# Patient Record
Sex: FEMALE | Race: WHITE | Employment: OTHER | URBAN - METROPOLITAN AREA
[De-identification: names, ages, dates, MRNs, and addresses within clinical notes are randomized per-mention and may not be internally consistent; named-entity substitution may affect disease eponyms.]

---

## 2020-08-28 ENCOUNTER — HOSPITAL ENCOUNTER (INPATIENT)
Age: 85
LOS: 4 days | Discharge: NURSING, REHAB OR LONG-TERM CARE | DRG: 481 | End: 2020-09-01
Attending: EMERGENCY MEDICINE | Admitting: INTERNAL MEDICINE
Payer: MEDICARE

## 2020-08-28 ENCOUNTER — APPOINTMENT (OUTPATIENT)
Dept: CT IMAGING | Age: 85
DRG: 481 | End: 2020-08-28
Payer: MEDICARE

## 2020-08-28 ENCOUNTER — APPOINTMENT (OUTPATIENT)
Dept: GENERAL RADIOLOGY | Age: 85
DRG: 481 | End: 2020-08-28
Payer: MEDICARE

## 2020-08-28 PROBLEM — M25.561 PAIN AND SWELLING OF RIGHT KNEE: Status: ACTIVE | Noted: 2020-08-28

## 2020-08-28 PROBLEM — M25.461 PAIN AND SWELLING OF RIGHT KNEE: Status: ACTIVE | Noted: 2020-08-28

## 2020-08-28 LAB
ABO/RH: NORMAL
ANION GAP SERPL CALCULATED.3IONS-SCNC: 9 MMOL/L (ref 3–16)
ANTIBODY SCREEN: NORMAL
ANTIBODY SCREEN: NORMAL
APTT: 32.1 SEC (ref 24.2–36.2)
BASOPHILS ABSOLUTE: 0.1 K/UL (ref 0–0.2)
BASOPHILS RELATIVE PERCENT: 0.7 %
BUN BLDV-MCNC: 26 MG/DL (ref 7–20)
CALCIUM SERPL-MCNC: 9.2 MG/DL (ref 8.3–10.6)
CHLORIDE BLD-SCNC: 105 MMOL/L (ref 99–110)
CO2: 27 MMOL/L (ref 21–32)
CREAT SERPL-MCNC: 1 MG/DL (ref 0.6–1.2)
EOSINOPHILS ABSOLUTE: 0 K/UL (ref 0–0.6)
EOSINOPHILS RELATIVE PERCENT: 0.2 %
GFR AFRICAN AMERICAN: >60
GFR NON-AFRICAN AMERICAN: 53
GLUCOSE BLD-MCNC: 131 MG/DL (ref 70–99)
HCT VFR BLD CALC: 30.6 % (ref 36–48)
HEMOGLOBIN: 10.3 G/DL (ref 12–16)
INR BLD: 1.09 (ref 0.86–1.14)
LYMPHOCYTES ABSOLUTE: 0.8 K/UL (ref 1–5.1)
LYMPHOCYTES RELATIVE PERCENT: 7.6 %
MCH RBC QN AUTO: 31.8 PG (ref 26–34)
MCHC RBC AUTO-ENTMCNC: 33.7 G/DL (ref 31–36)
MCV RBC AUTO: 94.3 FL (ref 80–100)
MONOCYTES ABSOLUTE: 1 K/UL (ref 0–1.3)
MONOCYTES RELATIVE PERCENT: 9.3 %
NEUTROPHILS ABSOLUTE: 8.9 K/UL (ref 1.7–7.7)
NEUTROPHILS RELATIVE PERCENT: 82.2 %
PDW BLD-RTO: 14.2 % (ref 12.4–15.4)
PLATELET # BLD: 254 K/UL (ref 135–450)
PMV BLD AUTO: 8.5 FL (ref 5–10.5)
POTASSIUM REFLEX MAGNESIUM: 4.5 MMOL/L (ref 3.5–5.1)
PROTHROMBIN TIME: 12.7 SEC (ref 10–13.2)
RBC # BLD: 3.24 M/UL (ref 4–5.2)
SODIUM BLD-SCNC: 141 MMOL/L (ref 136–145)
WBC # BLD: 10.8 K/UL (ref 4–11)

## 2020-08-28 PROCEDURE — 73502 X-RAY EXAM HIP UNI 2-3 VIEWS: CPT

## 2020-08-28 PROCEDURE — 85610 PROTHROMBIN TIME: CPT

## 2020-08-28 PROCEDURE — 86900 BLOOD TYPING SEROLOGIC ABO: CPT

## 2020-08-28 PROCEDURE — U0003 INFECTIOUS AGENT DETECTION BY NUCLEIC ACID (DNA OR RNA); SEVERE ACUTE RESPIRATORY SYNDROME CORONAVIRUS 2 (SARS-COV-2) (CORONAVIRUS DISEASE [COVID-19]), AMPLIFIED PROBE TECHNIQUE, MAKING USE OF HIGH THROUGHPUT TECHNOLOGIES AS DESCRIBED BY CMS-2020-01-R: HCPCS

## 2020-08-28 PROCEDURE — 6370000000 HC RX 637 (ALT 250 FOR IP): Performed by: STUDENT IN AN ORGANIZED HEALTH CARE EDUCATION/TRAINING PROGRAM

## 2020-08-28 PROCEDURE — 99284 EMERGENCY DEPT VISIT MOD MDM: CPT

## 2020-08-28 PROCEDURE — 85730 THROMBOPLASTIN TIME PARTIAL: CPT

## 2020-08-28 PROCEDURE — 73562 X-RAY EXAM OF KNEE 3: CPT

## 2020-08-28 PROCEDURE — 1200000000 HC SEMI PRIVATE

## 2020-08-28 PROCEDURE — 85025 COMPLETE CBC W/AUTO DIFF WBC: CPT

## 2020-08-28 PROCEDURE — 86850 RBC ANTIBODY SCREEN: CPT

## 2020-08-28 PROCEDURE — 2580000003 HC RX 258: Performed by: STUDENT IN AN ORGANIZED HEALTH CARE EDUCATION/TRAINING PROGRAM

## 2020-08-28 PROCEDURE — P9016 RBC LEUKOCYTES REDUCED: HCPCS

## 2020-08-28 PROCEDURE — 80048 BASIC METABOLIC PNL TOTAL CA: CPT

## 2020-08-28 PROCEDURE — 86923 COMPATIBILITY TEST ELECTRIC: CPT

## 2020-08-28 PROCEDURE — 71045 X-RAY EXAM CHEST 1 VIEW: CPT

## 2020-08-28 PROCEDURE — 86901 BLOOD TYPING SEROLOGIC RH(D): CPT

## 2020-08-28 PROCEDURE — 73700 CT LOWER EXTREMITY W/O DYE: CPT

## 2020-08-28 RX ORDER — BISACODYL 10 MG
10 SUPPOSITORY, RECTAL RECTAL NIGHTLY PRN
COMMUNITY

## 2020-08-28 RX ORDER — SODIUM CHLORIDE 0.9 % (FLUSH) 0.9 %
10 SYRINGE (ML) INJECTION EVERY 12 HOURS SCHEDULED
Status: DISCONTINUED | OUTPATIENT
Start: 2020-08-28 | End: 2020-09-01 | Stop reason: HOSPADM

## 2020-08-28 RX ORDER — ACETAMINOPHEN 650 MG/1
650 SUPPOSITORY RECTAL EVERY 6 HOURS PRN
Status: DISCONTINUED | OUTPATIENT
Start: 2020-08-28 | End: 2020-09-01 | Stop reason: HOSPADM

## 2020-08-28 RX ORDER — CALCIUM CARBONATE 500(1250)
500 TABLET ORAL 2 TIMES DAILY
COMMUNITY

## 2020-08-28 RX ORDER — LOPERAMIDE HYDROCHLORIDE 2 MG/1
2 CAPSULE ORAL 4 TIMES DAILY PRN
COMMUNITY

## 2020-08-28 RX ORDER — ONDANSETRON 2 MG/ML
4 INJECTION INTRAMUSCULAR; INTRAVENOUS EVERY 6 HOURS PRN
Status: DISCONTINUED | OUTPATIENT
Start: 2020-08-28 | End: 2020-09-01 | Stop reason: HOSPADM

## 2020-08-28 RX ORDER — FLUVOXAMINE MALEATE 50 MG/1
75 TABLET, COATED ORAL NIGHTLY
Status: DISCONTINUED | OUTPATIENT
Start: 2020-08-28 | End: 2020-09-01 | Stop reason: HOSPADM

## 2020-08-28 RX ORDER — OXYCODONE HYDROCHLORIDE AND ACETAMINOPHEN 5; 325 MG/1; MG/1
1 TABLET ORAL EVERY 4 HOURS PRN
Status: ON HOLD | COMMUNITY
End: 2020-08-30 | Stop reason: HOSPADM

## 2020-08-28 RX ORDER — FLUVOXAMINE MALEATE 100 MG
50 TABLET ORAL NIGHTLY
Status: DISCONTINUED | OUTPATIENT
Start: 2020-08-28 | End: 2020-08-28 | Stop reason: SDUPTHER

## 2020-08-28 RX ORDER — UREA 10 %
3 LOTION (ML) TOPICAL NIGHTLY PRN
Status: DISCONTINUED | OUTPATIENT
Start: 2020-08-28 | End: 2020-09-01 | Stop reason: HOSPADM

## 2020-08-28 RX ORDER — DOCUSATE SODIUM 100 MG/1
100 CAPSULE, LIQUID FILLED ORAL 2 TIMES DAILY
COMMUNITY

## 2020-08-28 RX ORDER — POLYETHYLENE GLYCOL 3350 17 G/17G
17 POWDER, FOR SOLUTION ORAL DAILY
COMMUNITY

## 2020-08-28 RX ORDER — LEVOTHYROXINE SODIUM 0.03 MG/1
25 TABLET ORAL DAILY
Status: DISCONTINUED | OUTPATIENT
Start: 2020-08-29 | End: 2020-09-01 | Stop reason: HOSPADM

## 2020-08-28 RX ORDER — GUAIFENESIN 100 MG/5ML
200 SOLUTION ORAL EVERY 6 HOURS PRN
COMMUNITY

## 2020-08-28 RX ORDER — PROMETHAZINE HYDROCHLORIDE 25 MG/1
12.5 TABLET ORAL EVERY 6 HOURS PRN
Status: DISCONTINUED | OUTPATIENT
Start: 2020-08-28 | End: 2020-09-01 | Stop reason: HOSPADM

## 2020-08-28 RX ORDER — LORATADINE 10 MG/1
10 TABLET ORAL EVERY EVENING
COMMUNITY

## 2020-08-28 RX ORDER — POLYETHYLENE GLYCOL 3350 17 G/17G
17 POWDER, FOR SOLUTION ORAL DAILY PRN
Status: DISCONTINUED | OUTPATIENT
Start: 2020-08-28 | End: 2020-09-01 | Stop reason: HOSPADM

## 2020-08-28 RX ORDER — SODIUM CHLORIDE 9 MG/ML
INJECTION, SOLUTION INTRAVENOUS CONTINUOUS
Status: DISCONTINUED | OUTPATIENT
Start: 2020-08-28 | End: 2020-09-01 | Stop reason: HOSPADM

## 2020-08-28 RX ORDER — LORAZEPAM 0.5 MG/1
0.5 TABLET ORAL 2 TIMES DAILY
COMMUNITY

## 2020-08-28 RX ORDER — FAMOTIDINE 20 MG/1
20 TABLET, FILM COATED ORAL NIGHTLY
Status: DISCONTINUED | OUTPATIENT
Start: 2020-08-28 | End: 2020-08-28

## 2020-08-28 RX ORDER — SODIUM CHLORIDE 0.9 % (FLUSH) 0.9 %
10 SYRINGE (ML) INJECTION PRN
Status: DISCONTINUED | OUTPATIENT
Start: 2020-08-28 | End: 2020-09-01 | Stop reason: HOSPADM

## 2020-08-28 RX ORDER — ACETAMINOPHEN 325 MG/1
650 TABLET ORAL EVERY 6 HOURS PRN
Status: DISCONTINUED | OUTPATIENT
Start: 2020-08-28 | End: 2020-09-01 | Stop reason: HOSPADM

## 2020-08-28 RX ORDER — MAGNESIUM OXIDE 400 MG/1
1000 TABLET ORAL DAILY
Status: DISCONTINUED | OUTPATIENT
Start: 2020-08-28 | End: 2020-08-28

## 2020-08-28 RX ORDER — OXYMETAZOLINE HYDROCHLORIDE 0.05 G/100ML
2 SPRAY NASAL 2 TIMES DAILY PRN
COMMUNITY

## 2020-08-28 RX ORDER — ACETAMINOPHEN 325 MG/1
650 TABLET ORAL ONCE
Status: COMPLETED | OUTPATIENT
Start: 2020-08-28 | End: 2020-08-28

## 2020-08-28 RX ADMIN — SODIUM CHLORIDE: 9 INJECTION, SOLUTION INTRAVENOUS at 21:22

## 2020-08-28 RX ADMIN — Medication 10 ML: at 21:23

## 2020-08-28 RX ADMIN — ACETAMINOPHEN 650 MG: 325 TABLET ORAL at 19:38

## 2020-08-28 ASSESSMENT — PAIN SCALES - GENERAL: PAINLEVEL_OUTOF10: 0

## 2020-08-28 NOTE — ED TRIAGE NOTES
PT WITH NOTED RIGHT KNEE SWELLING, UNKNOWN ORIGIN, NO KNOWN FALL FROM NURSING HOME, NO BRUISING NOTED, DENIES PAIN, DEMENTIA, ON ELIQUIS

## 2020-08-28 NOTE — ED PROVIDER NOTES
[address] [facility] RESIDENT NOTE       Date of evaluation: 8/28/2020    Chief Complaint     Leg Swelling (RIGHT KNEE)      of Present Illness     Meng Mendoza is a 80 y.o. female presents with right knee swelling. She lives in a memory unit at a skilled nursing facility for her Alzheimer's and was found to have a swollen knee today. Unknown report as to whether she fell or why the knee is swollen. Her  at bedside states that he saw her yesterday and her knee was normal, therefore helping with the timeframe sometime between the last day. The patient is a poor historian at baseline due to her dementia, has some mild pain in the knee and is wheelchair-bound at baseline. Denies any other symptoms. Her  states she has had a total hip replacement on the right. Other than stated above, no additional aggravating or alleviating factors are noted. Review of Systems     Unable to obtain due to patient's poor mental status    Past Medical, Surgical, Family, and Social History     She has a past medical history of Bleeding ulcer, Carpal tunnel syndrome, Mucous carcinoma ([address] and Stroke ([facility] [address] She has a past surgical history that includes Rotator cuff repair (2004) and Wrist fracture surgery (05/2009). Her family history includes Alzheimer's Disease in her brother; Arthritis in her mother; Diabetes in her mother. She reports that she has never smoked. She does not have any smokeless tobacco history on file. She reports current alcohol use. She reports that she does not use drugs. Medications     Previous Medications    ACETAMINOPHEN (TYLENOL) 500 MG TABLET    Take 500 mg by mouth every 6 hours as needed for Pain    ACIPHEX 20 MG TABLET        APIXABAN (ELIQUIS) 2.5 MG TABS TABLET    Take 2.5 mg by mouth 2 times daily    CONJUGATED ESTROGENS (PREMARIN) 0.625 MG/GM VAGINAL CREAM    Place 0.5 g vaginally twice a week Place vaginally daily. DEXTROMETHORPHAN-GUAIFENESIN (MUCINEX DM)  MG PER EXTENDED RELEASE TABLET    Take 1 tablet by mouth every 12 hours as needed    DICLOFENAC SODIUM 1 % GEL    Apply 2 g topically 2 times daily    FLUVOXAMINE (LUVOX) 50 MG TABLET    Take 50 mg by mouth nightly    FOSAMAX PLUS D  MG-UNIT PER TABLET        LEVOTHYROXINE (SYNTHROID) 25 MCG TABLET    Take 25 mcg by mouth Daily    LORAZEPAM (ATIVAN) 0.5 MG TABLET    Take 0.5 mg by mouth every 6 hours as needed for Anxiety. Radha Simpler MAGNESIUM OXIDE (MAG-OX) 400 MG TABLET    Take 1,000 mg by mouth daily    MELATONIN 3 MG TABS TABLET    Take 3 mg by mouth nightly as needed    METOPROLOL (TOPROL XL) 25 MG XL TABLET    Take 50 mg by mouth daily     RANITIDINE (ZANTAC) 150 MG CAPSULE    Take 150 mg by mouth nightly    ROPINIROLE (REQUIP) 2 MG TABLET    Take 2 mg by mouth 3 times daily    SENNA (SENOKOT) 8.6 MG TABLET    Take 1 tablet by mouth every 12 hours as needed for Constipation    VITAMIN C (ASCORBIC ACID) 500 MG TABLET    Take 500 mg by mouth 3 times daily       Allergies     She is allergic to bactrim; erythromycin; penicillins; and sulfa antibiotics. Physical Exam     INITIAL VITALS: BP: 108/81, Temp: 98.4 °F (36.9 °C), Pulse: 78, Resp: 14,     General:  Well appearing. No acute distress  Eyes:  PERRL. No discharge from eyes   ENT:  No discharge from nose. OP clear  Neck:  Supple, trachea midline  Pulmonary:   Non-labored breathing. Breath sounds clear bilaterally  Cardiac:  Regular rate and rhythm. No murmurs  Abdomen:  Soft. Non-distended. Non-tender. Musculoskeletal: No open fracture, significant joint effusion of the right knee with apparent deformity. Strong DP pulses and she is neurovascularly intact. She has some tenderness to palpation on the anterior aspect of the knee tracking up into the femur. She is able to both flex and extend the knee. Vascular:  Extremities warm and perfused.  Normal pulses in all 4 extremities  Skin:  Dry, no rashes  Extremities:  No peripheral edema  Neuro: Alert. Moves all four extremities to command. Sensation grossly intact to light touch. Speech and mentation normal. No focal deficit. Gait narrow and stable. Diagnostic Results     EKG   No EKG performed    RADIOLOGY:  XR CHEST PORTABLE   Final Result      Chronic interstitial lung disease, acute on chronic process not excluded, but no dense focal consolidation. If desired CT of chest may be helpful for further characterization. XR KNEE RIGHT (3 VIEWS)   Final Result      No acute injury. RIGHT KNEE:      REASON FOR EXAM: Trauma, pain      FINDINGS:      Somewhat obliqued AP and crosstable lateral views of the right knee again demonstrate long stem intramedullary nail within the right femur with single distal interlocking screw. Comminuted fracture of the distal femoral diaphysis in the region of the    distal aspect of the intramedullary nail with varus angulation. No additional fractures. Knee joint maintained. IMPRESSION:      Comminuted angulated fracture of the distal femoral diaphysis. XR HIP 2-3 VW W PELVIS RIGHT   Final Result      No acute injury. RIGHT KNEE:      REASON FOR EXAM: Trauma, pain      FINDINGS:      Somewhat obliqued AP and crosstable lateral views of the right knee again demonstrate long stem intramedullary nail within the right femur with single distal interlocking screw. Comminuted fracture of the distal femoral diaphysis in the region of the    distal aspect of the intramedullary nail with varus angulation. No additional fractures. Knee joint maintained. IMPRESSION:      Comminuted angulated fracture of the distal femoral diaphysis. LABS:   No results found for this visit on 08/28/20.     ED BEDSIDE ULTRASOUND:      RECENT VITALS:  BP: 108/81, Temp: 98.4 °F (36.9 °C), Pulse: 78,Resp: 14,       Procedures         ED Course     Nursing Notes, Past Medical Hx, Past Surgical Hx, Social Hx, Allergies, and Family Hx were reviewed. The patient was given the followingmedications:  Orders Placed This Encounter   Medications    acetaminophen (TYLENOL) tablet 650 mg       CONSULTS:  Keesha Jain / ASSESSMENT / Costella Schirmer is a 80 y.o. female with a history and presentation as described above in HPI. The patient was evaluated by myself and the ED Attending Physician, Dr. Christopher Martínez. All management and disposition plans were discussed and agreed upon. Upon presentation, the patient was well appearing and had stable vitals. Obtained hip and knee fractures on the right which showed comminuted angulated fracture of the distal femoral diaphysis. Will consult orthopedic surgery and admit to medicine. Basic preop work-up ordered including CBC, BMP, type and screen, INR, PTT, chest x-ray, EKG, urinalysis, COVID test.    Orthopedic surgery recommended CT scan of the knee for better characterization of whether the fracture extended to the joint. He also would like her placed in a knee immobilizer. Plan is for surgery in the morning. Pain control with Tylenol. The patient does not report significant pain while she is lying at rest.    Admit: At this time the patient has been admitted to medicine for further evaluation and management of femur fracture. The patient will continue to be monitored here in the emergency department until which time she is moved to her new treatment location. Discussed with admitting team.    Medications received during this ED visit:    Medications   acetaminophen (TYLENOL) tablet 650 mg (has no administration in time range)       Clinical Impression     1.  Closed fracture of distal end of right femur, unspecified fracture morphology, initial encounter [facility])        Disposition     PATIENT REFERRED TO:  MD Vahe Gonzales [place] [address]:  New Prescriptions    No medications on file       Lakhwinder Carter MD  Resident  08/28/20 Franklin Ku MD  Resident  08/28/20 3113

## 2020-08-28 NOTE — ED NOTES
Attempt to call report, RN states not able accept report at this time, will cont to monitor     Sameera Pretty, JESSICA  08/28/20 1934

## 2020-08-28 NOTE — H&P
Internal Medicine  PGY 1  History & Physical      CC: right knee pain     History Obtained From:  patient, spouse (poor historians)    HISTORY OF PRESENT ILLNESS: Sharad Stewart is a 79yo female presenting with right knee swelling. She lives in a memory unit at a skilled nursing facility and her  helped provide history. Neither party is sure exactly what occurred; pt's  went to see his wife for his daily visit when he noticed her knee swelling. He asked what happened and the nurse said they were getting an XR to evaluate. He saw her yesterday but did not appreciate the swelling then. He decided to then take his wife to the ED to get it looked at. Pt was unable to answer many questions but was not in pain. Attempted to call Pt's residence for further information but was unable to get ahold of anyone. In the ED VSS, xray of the right knee revealed a comminuted angulated fracture of the distal femoral diaphysis. CT ordered to assess for joint involvement. Orthopedic surgery- tomorrow AM.     Past Medical History:        Diagnosis Date    Bleeding ulcer 1988 and 1989    Carpal tunnel syndrome     [facility] [address]     Stroke ([facility] [address] 1-1-11       Past Surgical History:        Procedure Laterality Date    ROTATOR CUFF REPAIR  2004    left shoulder    WRIST FRACTURE SURGERY  05/2009    left wrist       Medications Priorto Admission:    Not in a hospital admission. Allergies:  Bactrim; Erythromycin; Penicillins; and Sulfa antibiotics    Social History:   · TOBACCO:   reports that she has never smoked. She does not have any smokeless tobacco history on file.   · ETOH:  Does not drink  · DRUGS : Does not drink   · Patient currently lives in a nursing home  ·   Family History:       Problem Relation Age of Onset    Arthritis Mother     Diabetes Mother     Alzheimer's Disease Brother        Review of Systems   Unable to perform ROS: Dementia       ROS: A 10 point review of systems was crosstable lateral views of the right knee again demonstrate long stem intramedullary nail within the right femur with single distal interlocking screw. Comminuted fracture of the distal femoral diaphysis in the region of the    distal aspect of the intramedullary nail with varus angulation. No additional fractures. Knee joint maintained. IMPRESSION:      Comminuted angulated fracture of the distal femoral diaphysis. XR HIP 2-3 VW W PELVIS RIGHT   Final Result      No acute injury. RIGHT KNEE:      REASON FOR EXAM: Trauma, pain      FINDINGS:      Somewhat obliqued AP and crosstable lateral views of the right knee again demonstrate long stem intramedullary nail within the right femur with single distal interlocking screw. Comminuted fracture of the distal femoral diaphysis in the region of the    distal aspect of the intramedullary nail with varus angulation. No additional fractures. Knee joint maintained. IMPRESSION:      Comminuted angulated fracture of the distal femoral diaphysis. CT KNEE RIGHT WO CONTRAST    (Results Pending)       ASSESSMENT AND PLAN:  Lillian Tate is an 79yo F w a PMH of dementia and CVA p/w right knee swelling. Pt does not recall how the event occurred and  noticed the swelling and brought her to the ED. Right Femoral Fracture:  XR right knee showing comminuted angulated fracture of the distil femoral diaphysis. Ortho involved.   - CT right knee w/o contrast per ortho   - surgery tomorrow am, basic pre-op orders   - acetaminophen q6h  - IVF  - NPO at midnight     Anemia:  HGB 10.8  - measure H&H    Will discuss with attending physician Dr. Galvez Meter Status: Full code  FEN: npo after midnight  PPX: none  DISPO: Jerry Perez 9 MS4    Pablo Dominguez DO, PGY 1  8/28/2020,  7:32 PM

## 2020-08-28 NOTE — ED PROVIDER NOTES
ED Attending Attestation Note     Date of evaluation: 8/28/2020    This patient was seen by the resident. I have seen and examined the patient, agree with the workup, evaluation, management and diagnosis. The care plan has been discussed. I have reviewed the ECG and concur with the resident's interpretation. My assessment reveals patient with an injury to her right knee. Unfortunately patient has a history of Alzheimer's and unable to give us any history. On exam patient has obvious deformity of her right knee but does have good distal pulses. Marshall Jimenez MD  08/28/20 [id number]

## 2020-08-28 NOTE — ED NOTES
Report called to floor, questions answered, safe hand off of care, request CT scan complete before transport, notified that might be delayed d/t to ED unit acuity at this time      Trey Shah, JESSICA  08/28/20 1950

## 2020-08-28 NOTE — H&P
Internal Medicine  PGY ***  History & Physical      CC: right knee pain     History Obtained From:  patient, spouse (poor historians)    HISTORY OF PRESENT ILLNESS: Lillian Tate is a 79yo female presenting with right knee swelling. She lives in a memory unit at a skilled nursing facility and her  helped provide history. Neither party is sure exactly what occurred; pt's  went to see his wife for his daily visit when he noticed her knee swelling. He asked what happened and the nurse said they were getting an XR to evaluate. He saw her yesterday but did not appreciate the swelling then. He decided to then take his wife to the ED to get it looked at. Pt was unable to answer many questions but was not in pain. Attempted to call Pt's residence for further information but was unable to get ahold of anyone. Past Medical History:        Diagnosis Date    Bleeding ulcer 1988 and 1989    Carpal tunnel syndrome     [facility] [address]     Stroke ([facility] [address] 1-1-11   ·     Past Surgical History:        Procedure Laterality Date    ROTATOR CUFF REPAIR  2004    left shoulder    WRIST FRACTURE SURGERY  05/2009    left wrist   ·     Medications Priorto Admission:    · Not in a hospital admission. Allergies:  Bactrim; Erythromycin; Penicillins; and Sulfa antibiotics    Social History:   · TOBACCO:   reports that she has never smoked. She does not have any smokeless tobacco history on file. · ETOH:  Does not drink  · DRUGS : Does not drink   · Patient currently lives {LIVING SITUATION:[id number]}  ·   Family History:       Problem Relation Age of Onset    Arthritis Mother     Diabetes Mother     Alzheimer's Disease Brother    ·     Review of Systems   Unable to perform ROS: Dementia       ROS: A 10 point review of systems was conducted, significant findings as noted in HPI. Physical Exam  Constitutional:       Appearance: Normal appearance. HENT:      Head: Normocephalic and atraumatic.       Nose: Nose normal.   Cardiovascular:      Rate and Rhythm: Normal rate and regular rhythm. Pulses: Normal pulses. Pulmonary:      Effort: Pulmonary effort is normal.      Breath sounds: Normal breath sounds. Abdominal:      General: Abdomen is flat. Musculoskeletal:         General: Swelling (r knee) and tenderness (w varus maneuver ) present. Skin:     General: Skin is dry. Neurological:      Mental Status: Mental status is at baseline. Vitals:    08/28/20 1527   BP: 108/81   Pulse: 78   Resp: 14   Temp: 98.4 °F (36.9 °C)       DATA:    Labs:  CBC:   Recent Labs     08/28/20  1821   WBC 10.8   HGB 10.3*   HCT 30.6*          BMP: No results for input(s): NA, K, CL, CO2, BUN, CREATININE, GLUCOSE, PHOS in the last 72 hours. Invalid input(s):  CA  LFT's: No results for input(s): AST, ALT, ALB, BILITOT, ALKPHOS in the last 72 hours. Troponin: No results for input(s): TROPONINI in the last 72 hours. BNP:No results for input(s): BNP in the last 72 hours. ABGs: No results for input(s): PHART, UWK1UTI, PO2ART in the last 72 hours. INR: No results for input(s): INR in the last 72 hours. U/A:No results for input(s): NITRITE, COLORU, PHUR, LABCAST, WBCUA, RBCUA, MUCUS, TRICHOMONAS, YEAST, BACTERIA, CLARITYU, SPECGRAV, LEUKOCYTESUR, UROBILINOGEN, BILIRUBINUR, BLOODU, GLUCOSEU, AMORPHOUS in the last 72 hours. Invalid input(s): KETONESU    XR CHEST PORTABLE   Final Result      Chronic interstitial lung disease, acute on chronic process not excluded, but no dense focal consolidation. If desired CT of chest may be helpful for further characterization. XR KNEE RIGHT (3 VIEWS)   Final Result      No acute injury. RIGHT KNEE:      REASON FOR EXAM: Trauma, pain      FINDINGS:      Somewhat obliqued AP and crosstable lateral views of the right knee again demonstrate long stem intramedullary nail within the right femur with single distal interlocking screw.  Comminuted fracture of the distal femoral diaphysis in the region of the    distal aspect of the intramedullary nail with varus angulation. No additional fractures. Knee joint maintained. IMPRESSION:      Comminuted angulated fracture of the distal femoral diaphysis. XR HIP 2-3 VW W PELVIS RIGHT   Final Result      No acute injury. RIGHT KNEE:      REASON FOR EXAM: Trauma, pain      FINDINGS:      Somewhat obliqued AP and crosstable lateral views of the right knee again demonstrate long stem intramedullary nail within the right femur with single distal interlocking screw. Comminuted fracture of the distal femoral diaphysis in the region of the    distal aspect of the intramedullary nail with varus angulation. No additional fractures. Knee joint maintained. IMPRESSION:      Comminuted angulated fracture of the distal femoral diaphysis. CT KNEE RIGHT WO CONTRAST    (Results Pending)       ASSESSMENT AND PLAN:  Jean Claude Martinez is an 79yo F w a PMH of dementia and CVA p/w right knee swelling. Pt does not recall how the event occurred and  noticed the swelling and brought her to the ED. Right Femoral Fracture:  XR right knee showing comminuted angulated fracture of the distil femoral diaphysis. Ortho involved.   - CT right knee w/o contrast per ortho   - surgery tomorrow am, basic pre-op orders   - acetaminophen 500mg q6  - IVF  - NPO at midnight     Anemia:  HGB 10.8  - measure H&H    Will discuss with attending physician      Code Status: Full code  FEN:   PPX:   DISPO: [email or web address]    Demario Richards, MS4  8/28/2020,  6:48 PM

## 2020-08-29 ENCOUNTER — APPOINTMENT (OUTPATIENT)
Dept: GENERAL RADIOLOGY | Age: 85
DRG: 481 | End: 2020-08-29
Payer: MEDICARE

## 2020-08-29 ENCOUNTER — ANESTHESIA EVENT (OUTPATIENT)
Dept: OPERATING ROOM | Age: 85
DRG: 481 | End: 2020-08-29
Payer: MEDICARE

## 2020-08-29 ENCOUNTER — ANESTHESIA (OUTPATIENT)
Dept: OPERATING ROOM | Age: 85
DRG: 481 | End: 2020-08-29
Payer: MEDICARE

## 2020-08-29 VITALS
DIASTOLIC BLOOD PRESSURE: 60 MMHG | RESPIRATION RATE: 14 BRPM | TEMPERATURE: 99 F | OXYGEN SATURATION: 100 % | SYSTOLIC BLOOD PRESSURE: 127 MMHG

## 2020-08-29 LAB
AMORPHOUS: ABNORMAL /HPF
ANION GAP SERPL CALCULATED.3IONS-SCNC: 10 MMOL/L (ref 3–16)
BACTERIA: ABNORMAL /HPF
BASOPHILS ABSOLUTE: 0.1 K/UL (ref 0–0.2)
BASOPHILS RELATIVE PERCENT: 1 %
BILIRUBIN URINE: NEGATIVE
BLOOD, URINE: NEGATIVE
BUN BLDV-MCNC: 21 MG/DL (ref 7–20)
CALCIUM SERPL-MCNC: 8.6 MG/DL (ref 8.3–10.6)
CHLORIDE BLD-SCNC: 106 MMOL/L (ref 99–110)
CLARITY: CLEAR
CO2: 25 MMOL/L (ref 21–32)
COLOR: YELLOW
CREAT SERPL-MCNC: 0.8 MG/DL (ref 0.6–1.2)
CRYSTALS, UA: ABNORMAL /HPF
EOSINOPHILS ABSOLUTE: 0 K/UL (ref 0–0.6)
EOSINOPHILS RELATIVE PERCENT: 0.5 %
GFR AFRICAN AMERICAN: >60
GFR NON-AFRICAN AMERICAN: >60
GLUCOSE BLD-MCNC: 102 MG/DL (ref 70–99)
GLUCOSE URINE: NEGATIVE MG/DL
HCT VFR BLD CALC: 29.4 % (ref 36–48)
HEMOGLOBIN: 10 G/DL (ref 12–16)
KETONES, URINE: NEGATIVE MG/DL
LEUKOCYTE ESTERASE, URINE: ABNORMAL
LYMPHOCYTES ABSOLUTE: 0.7 K/UL (ref 1–5.1)
LYMPHOCYTES RELATIVE PERCENT: 8.1 %
MAGNESIUM: 2.1 MG/DL (ref 1.8–2.4)
MCH RBC QN AUTO: 32 PG (ref 26–34)
MCHC RBC AUTO-ENTMCNC: 34 G/DL (ref 31–36)
MCV RBC AUTO: 94.1 FL (ref 80–100)
MICROSCOPIC EXAMINATION: YES
MONOCYTES ABSOLUTE: 1 K/UL (ref 0–1.3)
MONOCYTES RELATIVE PERCENT: 11.2 %
NEUTROPHILS ABSOLUTE: 7.2 K/UL (ref 1.7–7.7)
NEUTROPHILS RELATIVE PERCENT: 79.2 %
NITRITE, URINE: POSITIVE
PDW BLD-RTO: 14.4 % (ref 12.4–15.4)
PH UA: 8 (ref 5–8)
PHOSPHORUS: 3.1 MG/DL (ref 2.5–4.9)
PLATELET # BLD: 228 K/UL (ref 135–450)
PMV BLD AUTO: 8.5 FL (ref 5–10.5)
POTASSIUM REFLEX MAGNESIUM: 4 MMOL/L (ref 3.5–5.1)
PROTEIN UA: ABNORMAL MG/DL
RBC # BLD: 3.13 M/UL (ref 4–5.2)
RBC UA: ABNORMAL /HPF (ref 0–4)
SODIUM BLD-SCNC: 141 MMOL/L (ref 136–145)
SPECIFIC GRAVITY UA: 1.01 (ref 1–1.03)
T4 FREE: 0.9 NG/DL (ref 0.9–1.8)
T4 FREE: 1 NG/DL (ref 0.9–1.8)
TSH REFLEX: 7.4 UIU/ML (ref 0.27–4.2)
URINE TYPE: ABNORMAL
UROBILINOGEN, URINE: 0.2 E.U./DL
WBC # BLD: 9.1 K/UL (ref 4–11)
WBC UA: ABNORMAL /HPF (ref 0–5)

## 2020-08-29 PROCEDURE — 2580000003 HC RX 258: Performed by: ORTHOPAEDIC SURGERY

## 2020-08-29 PROCEDURE — 3700000001 HC ADD 15 MINUTES (ANESTHESIA): Performed by: ORTHOPAEDIC SURGERY

## 2020-08-29 PROCEDURE — 2580000003 HC RX 258: Performed by: STUDENT IN AN ORGANIZED HEALTH CARE EDUCATION/TRAINING PROGRAM

## 2020-08-29 PROCEDURE — 73552 X-RAY EXAM OF FEMUR 2/>: CPT

## 2020-08-29 PROCEDURE — 83735 ASSAY OF MAGNESIUM: CPT

## 2020-08-29 PROCEDURE — C1713 ANCHOR/SCREW BN/BN,TIS/BN: HCPCS | Performed by: ORTHOPAEDIC SURGERY

## 2020-08-29 PROCEDURE — 7100000000 HC PACU RECOVERY - FIRST 15 MIN: Performed by: ORTHOPAEDIC SURGERY

## 2020-08-29 PROCEDURE — 3209999900 FLUORO FOR SURGICAL PROCEDURES

## 2020-08-29 PROCEDURE — 1200000000 HC SEMI PRIVATE

## 2020-08-29 PROCEDURE — 81001 URINALYSIS AUTO W/SCOPE: CPT

## 2020-08-29 PROCEDURE — 3600000014 HC SURGERY LEVEL 4 ADDTL 15MIN: Performed by: ORTHOPAEDIC SURGERY

## 2020-08-29 PROCEDURE — 2709999900 HC NON-CHARGEABLE SUPPLY: Performed by: ORTHOPAEDIC SURGERY

## 2020-08-29 PROCEDURE — 2500000003 HC RX 250 WO HCPCS: Performed by: ANESTHESIOLOGY

## 2020-08-29 PROCEDURE — 6360000002 HC RX W HCPCS: Performed by: ORTHOPAEDIC SURGERY

## 2020-08-29 PROCEDURE — 85025 COMPLETE CBC W/AUTO DIFF WBC: CPT

## 2020-08-29 PROCEDURE — 3600000004 HC SURGERY LEVEL 4 BASE: Performed by: ORTHOPAEDIC SURGERY

## 2020-08-29 PROCEDURE — 0QSB04Z REPOSITION RIGHT LOWER FEMUR WITH INTERNAL FIXATION DEVICE, OPEN APPROACH: ICD-10-PCS | Performed by: ORTHOPAEDIC SURGERY

## 2020-08-29 PROCEDURE — 3700000000 HC ANESTHESIA ATTENDED CARE: Performed by: ORTHOPAEDIC SURGERY

## 2020-08-29 PROCEDURE — 2580000003 HC RX 258: Performed by: ANESTHESIOLOGY

## 2020-08-29 PROCEDURE — 80048 BASIC METABOLIC PNL TOTAL CA: CPT

## 2020-08-29 PROCEDURE — 84100 ASSAY OF PHOSPHORUS: CPT

## 2020-08-29 PROCEDURE — 2780000010 HC IMPLANT OTHER: Performed by: ORTHOPAEDIC SURGERY

## 2020-08-29 PROCEDURE — 0QPB04Z REMOVAL OF INTERNAL FIXATION DEVICE FROM RIGHT LOWER FEMUR, OPEN APPROACH: ICD-10-PCS | Performed by: ORTHOPAEDIC SURGERY

## 2020-08-29 PROCEDURE — 6360000002 HC RX W HCPCS: Performed by: ANESTHESIOLOGY

## 2020-08-29 PROCEDURE — 93005 ELECTROCARDIOGRAM TRACING: CPT | Performed by: INTERNAL MEDICINE

## 2020-08-29 PROCEDURE — 2720000010 HC SURG SUPPLY STERILE: Performed by: ORTHOPAEDIC SURGERY

## 2020-08-29 PROCEDURE — 6370000000 HC RX 637 (ALT 250 FOR IP): Performed by: ORTHOPAEDIC SURGERY

## 2020-08-29 PROCEDURE — 84443 ASSAY THYROID STIM HORMONE: CPT

## 2020-08-29 PROCEDURE — 36415 COLL VENOUS BLD VENIPUNCTURE: CPT

## 2020-08-29 PROCEDURE — 7100000001 HC PACU RECOVERY - ADDTL 15 MIN: Performed by: ORTHOPAEDIC SURGERY

## 2020-08-29 PROCEDURE — 84439 ASSAY OF FREE THYROXINE: CPT

## 2020-08-29 DEVICE — NCB PP DIST FEM PLATE, R,18 H, L. 355MM
Type: IMPLANTABLE DEVICE | Site: FEMUR | Status: FUNCTIONAL
Brand: NCB®

## 2020-08-29 DEVICE — NCB CANCELLOUS SCREW 5.0 32 L=75
Type: IMPLANTABLE DEVICE | Site: FEMUR | Status: FUNCTIONAL
Brand: NCB®

## 2020-08-29 DEVICE — NCB CANCELLOUS SCREW 5.0 32 L=65
Type: IMPLANTABLE DEVICE | Site: FEMUR | Status: FUNCTIONAL
Brand: NCB®

## 2020-08-29 DEVICE — NCB SCREW D 4.0 SELF-TAPPING, 28
Type: IMPLANTABLE DEVICE | Site: FEMUR | Status: FUNCTIONAL
Brand: NCB®

## 2020-08-29 DEVICE — KIRSCHNER WIRE, WITH TROCAR TIP, Ø 2.0 M
Type: IMPLANTABLE DEVICE | Site: FEMUR | Status: FUNCTIONAL
Brand: KIRSCHNER WIRE

## 2020-08-29 DEVICE — NCB SCREW D 4.0 SELF-TAPPING, 30
Type: IMPLANTABLE DEVICE | Site: FEMUR | Status: FUNCTIONAL
Brand: NCB®

## 2020-08-29 DEVICE — NCB CANCELLOUS SCREW 5.0 32 L=70
Type: IMPLANTABLE DEVICE | Site: FEMUR | Status: FUNCTIONAL
Brand: NCB®

## 2020-08-29 DEVICE — CABLE ORTH L91CM DIA1.8MM CO CHROM SMOOTH CBL-READY 00223200228] ZIMMER BIOMET TRAUMA]: Type: IMPLANTABLE DEVICE | Site: FEMUR | Status: FUNCTIONAL

## 2020-08-29 DEVICE — IMPLANTABLE DEVICE: Type: IMPLANTABLE DEVICE | Site: FEMUR | Status: FUNCTIONAL

## 2020-08-29 RX ORDER — OXYCODONE HYDROCHLORIDE 5 MG/1
5 TABLET ORAL EVERY 4 HOURS PRN
Status: DISCONTINUED | OUTPATIENT
Start: 2020-08-29 | End: 2020-09-01 | Stop reason: HOSPADM

## 2020-08-29 RX ORDER — DEXAMETHASONE SODIUM PHOSPHATE 4 MG/ML
INJECTION, SOLUTION INTRA-ARTICULAR; INTRALESIONAL; INTRAMUSCULAR; INTRAVENOUS; SOFT TISSUE PRN
Status: DISCONTINUED | OUTPATIENT
Start: 2020-08-29 | End: 2020-08-29 | Stop reason: SDUPTHER

## 2020-08-29 RX ORDER — ROCURONIUM BROMIDE 10 MG/ML
INJECTION, SOLUTION INTRAVENOUS PRN
Status: DISCONTINUED | OUTPATIENT
Start: 2020-08-29 | End: 2020-08-29 | Stop reason: SDUPTHER

## 2020-08-29 RX ORDER — MAGNESIUM HYDROXIDE 1200 MG/15ML
LIQUID ORAL CONTINUOUS PRN
Status: COMPLETED | OUTPATIENT
Start: 2020-08-29 | End: 2020-08-29

## 2020-08-29 RX ORDER — FENTANYL CITRATE 50 UG/ML
INJECTION, SOLUTION INTRAMUSCULAR; INTRAVENOUS PRN
Status: DISCONTINUED | OUTPATIENT
Start: 2020-08-29 | End: 2020-08-29 | Stop reason: SDUPTHER

## 2020-08-29 RX ORDER — DOCUSATE SODIUM 100 MG/1
100 CAPSULE, LIQUID FILLED ORAL 2 TIMES DAILY
Status: DISCONTINUED | OUTPATIENT
Start: 2020-08-29 | End: 2020-09-01 | Stop reason: HOSPADM

## 2020-08-29 RX ORDER — SODIUM CHLORIDE 0.9 % (FLUSH) 0.9 %
10 SYRINGE (ML) INJECTION EVERY 12 HOURS SCHEDULED
Status: DISCONTINUED | OUTPATIENT
Start: 2020-08-29 | End: 2020-09-01 | Stop reason: HOSPADM

## 2020-08-29 RX ORDER — VANCOMYCIN HYDROCHLORIDE 1 G/20ML
INJECTION, POWDER, LYOPHILIZED, FOR SOLUTION INTRAVENOUS PRN
Status: DISCONTINUED | OUTPATIENT
Start: 2020-08-29 | End: 2020-08-29 | Stop reason: ALTCHOICE

## 2020-08-29 RX ORDER — NEOSTIGMINE METHYLSULFATE 5 MG/5 ML
SYRINGE (ML) INTRAVENOUS PRN
Status: DISCONTINUED | OUTPATIENT
Start: 2020-08-29 | End: 2020-08-29 | Stop reason: SDUPTHER

## 2020-08-29 RX ORDER — SODIUM CHLORIDE 0.9 % (FLUSH) 0.9 %
10 SYRINGE (ML) INJECTION PRN
Status: DISCONTINUED | OUTPATIENT
Start: 2020-08-29 | End: 2020-09-01 | Stop reason: HOSPADM

## 2020-08-29 RX ORDER — PROPOFOL 10 MG/ML
INJECTION, EMULSION INTRAVENOUS PRN
Status: DISCONTINUED | OUTPATIENT
Start: 2020-08-29 | End: 2020-08-29 | Stop reason: SDUPTHER

## 2020-08-29 RX ORDER — LIDOCAINE HYDROCHLORIDE 20 MG/ML
INJECTION, SOLUTION INTRAVENOUS PRN
Status: DISCONTINUED | OUTPATIENT
Start: 2020-08-29 | End: 2020-08-29 | Stop reason: SDUPTHER

## 2020-08-29 RX ORDER — ROPINIROLE 1 MG/1
1 TABLET, FILM COATED ORAL 3 TIMES DAILY
Status: DISCONTINUED | OUTPATIENT
Start: 2020-08-29 | End: 2020-09-01 | Stop reason: HOSPADM

## 2020-08-29 RX ORDER — ONDANSETRON 2 MG/ML
INJECTION INTRAMUSCULAR; INTRAVENOUS PRN
Status: DISCONTINUED | OUTPATIENT
Start: 2020-08-29 | End: 2020-08-29 | Stop reason: SDUPTHER

## 2020-08-29 RX ORDER — GLYCOPYRROLATE 0.2 MG/ML
INJECTION INTRAMUSCULAR; INTRAVENOUS PRN
Status: DISCONTINUED | OUTPATIENT
Start: 2020-08-29 | End: 2020-08-29 | Stop reason: SDUPTHER

## 2020-08-29 RX ORDER — CEFAZOLIN SODIUM 1 G/3ML
INJECTION, POWDER, FOR SOLUTION INTRAMUSCULAR; INTRAVENOUS PRN
Status: DISCONTINUED | OUTPATIENT
Start: 2020-08-29 | End: 2020-08-29 | Stop reason: SDUPTHER

## 2020-08-29 RX ORDER — SENNA AND DOCUSATE SODIUM 50; 8.6 MG/1; MG/1
1 TABLET, FILM COATED ORAL 2 TIMES DAILY
Status: DISCONTINUED | OUTPATIENT
Start: 2020-08-29 | End: 2020-09-01 | Stop reason: HOSPADM

## 2020-08-29 RX ADMIN — PHENYLEPHRINE HYDROCHLORIDE 80 MCG: 10 INJECTION, SOLUTION INTRAMUSCULAR; INTRAVENOUS; SUBCUTANEOUS at 14:40

## 2020-08-29 RX ADMIN — DOCUSATE SODIUM 100 MG: 100 CAPSULE, LIQUID FILLED ORAL at 21:07

## 2020-08-29 RX ADMIN — Medication 4 MG: at 16:43

## 2020-08-29 RX ADMIN — SODIUM CHLORIDE: 9 INJECTION, SOLUTION INTRAVENOUS at 17:41

## 2020-08-29 RX ADMIN — FENTANYL CITRATE 50 MCG: 50 INJECTION INTRAMUSCULAR; INTRAVENOUS at 14:37

## 2020-08-29 RX ADMIN — PHENYLEPHRINE HYDROCHLORIDE 20 MCG: 10 INJECTION INTRAVENOUS at 16:27

## 2020-08-29 RX ADMIN — CEFAZOLIN 2 G: 10 INJECTION, POWDER, FOR SOLUTION INTRAVENOUS at 21:40

## 2020-08-29 RX ADMIN — PROPOFOL 100 MG: 10 INJECTION, EMULSION INTRAVENOUS at 14:37

## 2020-08-29 RX ADMIN — GLYCOPYRROLATE 0.4 MG: 0.2 INJECTION, SOLUTION INTRAMUSCULAR; INTRAVENOUS at 16:43

## 2020-08-29 RX ADMIN — Medication 10 ML: at 22:53

## 2020-08-29 RX ADMIN — Medication 10 ML: at 21:07

## 2020-08-29 RX ADMIN — FENTANYL CITRATE 50 MCG: 50 INJECTION INTRAMUSCULAR; INTRAVENOUS at 15:58

## 2020-08-29 RX ADMIN — ROCURONIUM BROMIDE 20 MG: 10 INJECTION INTRAVENOUS at 15:00

## 2020-08-29 RX ADMIN — CEFAZOLIN SODIUM 2000 MG: 1 POWDER, FOR SOLUTION INTRAMUSCULAR; INTRAVENOUS at 14:39

## 2020-08-29 RX ADMIN — PHENYLEPHRINE HYDROCHLORIDE 80 MCG: 10 INJECTION, SOLUTION INTRAMUSCULAR; INTRAVENOUS; SUBCUTANEOUS at 15:00

## 2020-08-29 RX ADMIN — PROPOFOL 100 MG: 10 INJECTION, EMULSION INTRAVENOUS at 16:31

## 2020-08-29 RX ADMIN — ONDANSETRON 4 MG: 2 INJECTION INTRAMUSCULAR; INTRAVENOUS at 15:08

## 2020-08-29 RX ADMIN — PHENYLEPHRINE HYDROCHLORIDE 20 MCG: 10 INJECTION INTRAVENOUS at 15:41

## 2020-08-29 RX ADMIN — ROPINIROLE HYDROCHLORIDE 1 MG: 1 TABLET, FILM COATED ORAL at 21:07

## 2020-08-29 RX ADMIN — FLUVOXAMINE MALEATE 75 MG: 50 TABLET, COATED ORAL at 21:06

## 2020-08-29 RX ADMIN — SODIUM CHLORIDE: 9 INJECTION, SOLUTION INTRAVENOUS at 12:47

## 2020-08-29 RX ADMIN — FAMOTIDINE 20 MG: 10 INJECTION, SOLUTION INTRAVENOUS at 15:08

## 2020-08-29 RX ADMIN — ROCURONIUM BROMIDE 30 MG: 10 INJECTION INTRAVENOUS at 14:37

## 2020-08-29 RX ADMIN — LIDOCAINE HYDROCHLORIDE 50 MG: 20 INJECTION, SOLUTION INTRAVENOUS at 14:37

## 2020-08-29 RX ADMIN — DOCUSATE SODIUM 50MG AND SENNOSIDES 8.6MG 1 TABLET: 8.6; 5 TABLET, FILM COATED ORAL at 21:07

## 2020-08-29 RX ADMIN — DEXAMETHASONE SODIUM PHOSPHATE 4 MG: 4 INJECTION, SOLUTION INTRAMUSCULAR; INTRAVENOUS at 15:08

## 2020-08-29 ASSESSMENT — PULMONARY FUNCTION TESTS
PIF_VALUE: 19
PIF_VALUE: 2
PIF_VALUE: 19
PIF_VALUE: 17
PIF_VALUE: 17
PIF_VALUE: 18
PIF_VALUE: 18
PIF_VALUE: 19
PIF_VALUE: 19
PIF_VALUE: 20
PIF_VALUE: 18
PIF_VALUE: 19
PIF_VALUE: 21
PIF_VALUE: 18
PIF_VALUE: 18
PIF_VALUE: 16
PIF_VALUE: 17
PIF_VALUE: 16
PIF_VALUE: 20
PIF_VALUE: 17
PIF_VALUE: 17
PIF_VALUE: 16
PIF_VALUE: 18
PIF_VALUE: 16
PIF_VALUE: 16
PIF_VALUE: 17
PIF_VALUE: 20
PIF_VALUE: 18
PIF_VALUE: 2
PIF_VALUE: 17
PIF_VALUE: 16
PIF_VALUE: 19
PIF_VALUE: 19
PIF_VALUE: 17
PIF_VALUE: 1
PIF_VALUE: 16
PIF_VALUE: 16
PIF_VALUE: 17
PIF_VALUE: 17
PIF_VALUE: 18
PIF_VALUE: 20
PIF_VALUE: 18
PIF_VALUE: 17
PIF_VALUE: 19
PIF_VALUE: 3
PIF_VALUE: 7
PIF_VALUE: 17
PIF_VALUE: 16
PIF_VALUE: 16
PIF_VALUE: 17
PIF_VALUE: 5
PIF_VALUE: 18
PIF_VALUE: 16
PIF_VALUE: 15
PIF_VALUE: 16
PIF_VALUE: 25
PIF_VALUE: 1
PIF_VALUE: 5
PIF_VALUE: 19
PIF_VALUE: 19
PIF_VALUE: 1
PIF_VALUE: 16
PIF_VALUE: 21
PIF_VALUE: 17
PIF_VALUE: 27
PIF_VALUE: 16
PIF_VALUE: 16
PIF_VALUE: 18
PIF_VALUE: 18
PIF_VALUE: 16
PIF_VALUE: 18
PIF_VALUE: 17
PIF_VALUE: 16
PIF_VALUE: 17
PIF_VALUE: 15
PIF_VALUE: 1
PIF_VALUE: 17
PIF_VALUE: 1
PIF_VALUE: 17
PIF_VALUE: 18
PIF_VALUE: 18
PIF_VALUE: 1
PIF_VALUE: 17
PIF_VALUE: 17
PIF_VALUE: 18
PIF_VALUE: 17
PIF_VALUE: 21
PIF_VALUE: 16
PIF_VALUE: 20
PIF_VALUE: 16
PIF_VALUE: 0
PIF_VALUE: 18
PIF_VALUE: 18
PIF_VALUE: 16
PIF_VALUE: 19
PIF_VALUE: 17
PIF_VALUE: 16
PIF_VALUE: 16
PIF_VALUE: 18
PIF_VALUE: 17
PIF_VALUE: 17
PIF_VALUE: 18
PIF_VALUE: 18
PIF_VALUE: 24
PIF_VALUE: 5
PIF_VALUE: 19
PIF_VALUE: 17
PIF_VALUE: 18
PIF_VALUE: 21
PIF_VALUE: 17
PIF_VALUE: 16
PIF_VALUE: 18
PIF_VALUE: 16
PIF_VALUE: 18
PIF_VALUE: 1
PIF_VALUE: 8
PIF_VALUE: 6
PIF_VALUE: 5
PIF_VALUE: 19
PIF_VALUE: 16
PIF_VALUE: 19
PIF_VALUE: 19
PIF_VALUE: 17
PIF_VALUE: 19
PIF_VALUE: 6
PIF_VALUE: 18
PIF_VALUE: 17
PIF_VALUE: 18
PIF_VALUE: 16
PIF_VALUE: 19
PIF_VALUE: 20
PIF_VALUE: 17
PIF_VALUE: 5
PIF_VALUE: 18
PIF_VALUE: 25
PIF_VALUE: 16
PIF_VALUE: 17
PIF_VALUE: 15
PIF_VALUE: 16
PIF_VALUE: 18
PIF_VALUE: 17
PIF_VALUE: 17
PIF_VALUE: 18

## 2020-08-29 ASSESSMENT — PAIN SCALES - PAIN ASSESSMENT IN ADVANCED DEMENTIA (PAINAD)
CONSOLABILITY: 0
BODYLANGUAGE: 0
BREATHING: 0
BREATHING: 0
CONSOLABILITY: 0
BODYLANGUAGE: 0
CONSOLABILITY: 0
NEGVOCALIZATION: 0
NEGVOCALIZATION: 0
TOTALSCORE: 0
FACIALEXPRESSION: 0
NEGVOCALIZATION: 0
TOTALSCORE: 0
BREATHING: 0
CONSOLABILITY: 0
BREATHING: 0
NEGVOCALIZATION: 0
FACIALEXPRESSION: 0
TOTALSCORE: 0
BODYLANGUAGE: 0
FACIALEXPRESSION: 0
BODYLANGUAGE: 0
FACIALEXPRESSION: 0
TOTALSCORE: 0

## 2020-08-29 ASSESSMENT — PAIN SCALES - GENERAL
PAINLEVEL_OUTOF10: 0

## 2020-08-29 NOTE — PROGRESS NOTES
Returned to floor post op ORIF rt. Hip dsg CDI, l;eg in immobilizer, a-boots bilat; huizar with 30ml urine,  MIVF at 75ml, changed to general diet, see vs, vss, assessment sheet cont.  To monitor

## 2020-08-29 NOTE — PROGRESS NOTES
Patient arrived from OR to PACU # 13 s/p RIGHT DISTAL FEMUR OPEN REDUCTION INTERNAL FIXATION AND REMOVAL HARDWARE (Right Leg Upper) per . Attached to PACU monitoring device, report received from CRNA who reported no problems intraoperatively, VSS. Patient drowsy on arrival but baseline is confusion. Immobilizer to RLE, continue to monitor.

## 2020-08-29 NOTE — PROGRESS NOTES
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 She lives in a memory unit at a skilled nursing facility and hr  helped provide history. Neither party is sure exactly what occurred; pt's  went to see his wife for his daily visit when he noticed her knee swelling. He asked what happened and the nurse said they were getting an XR to evaluate. He saw her yesterday but did not appreciate the swelling then. He decided to then take his wife to the ED to get it looked at. Pt was unable to answer many questions but was not in pain. Attempted to call Pt's residence for further information but was unable to get ahold of anyone.      In the ED VSS, xray of the right knee revealed a comminuted angulated fracture of the distal femoral diaphysis. CT ordered to assess for joint involvement. Ortho consulted. Interval history:     Pt is confused, though she appears to be at baseline. Is not aware of any broken bones and denies any pain, SOB, cough, or other complaints. Expresses frustration when told she may nee surgery. Pt is alert to first name only, makes nonsensical statements claiming she is in a dance, and is asking for something from cabinet that does not exist, is trying to pull stethoscope during lung exam. COVID rule out pending. CT Knee -  1. Comminuted, displaced and angulated fracture involving the distal diametaphysis of right femur   2. Severe osteoarthritis     CXR-  Chronic interstitial lung disease, acute on chronic process not excluded, but no dense focal consolidation. If desired CT of chest may be helpful for further characterization.       Medications:     Scheduled Meds:   sodium chloride flush  10 mL Intravenous 2 times per day    levothyroxine  25 mcg Oral Daily    fluvoxaMINE  75 mg Oral Nightly Continuous Infusions:   sodium chloride 75 mL/hr at 08/28/20 2122     PRN Meds:sodium chloride flush, acetaminophen **OR** acetaminophen, polyethylene glycol, promethazine **OR** ondansetron, melatonin    Objective:   Vitals:   T-max:  Patient Vitals for the past 8 hrs:   BP Temp Temp src Pulse Resp SpO2   08/29/20 0451 113/80 99.9 °F (37.7 °C) Axillary 91 16 92 %   08/29/20 0004 109/69 98.6 °F (37 °C) Oral 83 16 98 %       Intake/Output Summary (Last 24 hours) at 8/29/2020 0743  Last data filed at 8/29/2020 0523  Gross per 24 hour   Intake 0 ml   Output --   Net 0 ml       Review of Systems:  Constitutional: No fevers, chills  HENT: No congestion, dysphagia  Eyes: No visual changes, photophobia  Respiratory: No cough, shortness of breath, wheezing  Cardio: No chest pain, leg swelling, palpitations  GI: No abdominal distension, abdominal pain, diarrhea, nausea, vomiting  : No difficulty urinating, dysuria, increased urinary frequency or urgency  MSK: No arthralgias, myalgias, back pain  Skin: No rash, wound, pallor  Immuno: Not immunocompromised  Neuro: No dizziness, lightheadedness, headache, weakness  Heme/Lymph: Does not bruise or bleed easily  Psych: No confusion, anxiety, depression    Physical Exam:  Constitutional:       Appearance: Normal appearance. HENT:      Head: Normocephalic and atraumatic. Nose: Nose normal.   Cardiovascular:      Rate and Rhythm: Normal rate and regular rhythm. Pulses: Normal pulses. Pulmonary:      Effort: Pulmonary effort is normal.      Breath sounds: Normal breath sounds. Abdominal:      General: Abdomen is flat. Musculoskeletal:         General: Swelling (r knee) and tenderness (w varus maneuver ) present. Leg in wrap. Leg external rotated. Right LLE warm to touch, pulses intact 2+, sensation intact. Skin:     General: Skin is dry. Neurological:      Mental Status: Mental status is at baseline.  Pt is alert to first name only, makes nonsensical statements claiming she is in a dance, and is asking for something from cabinet that does not exist, is trying to pull stethoscope during lung exam.    LABS:    CBC:   Recent Labs     08/28/20 1821   WBC 10.8   HGB 10.3*   HCT 30.6*      MCV 94.3     Renal:    Recent Labs     08/28/20 1821 08/29/20  0650    141   K 4.5 4.0    106   CO2 27 25   BUN 26* 21*   CREATININE 1.0 0.8   GLUCOSE 131* 102*   CALCIUM 9.2 8.6   MG  --  2.10   PHOS  --  3.1   ANIONGAP 9 10     Hepatic: No results for input(s): AST, ALT, BILITOT, BILIDIR, PROT, LABALBU, ALKPHOS in the last 72 hours. Troponin: No results for input(s): TROPONINI in the last 72 hours. BNP: No results for input(s): BNP in the last 72 hours. Lipids: No results for input(s): CHOL, HDL in the last 72 hours. Invalid input(s): LDLCALCU, TRIGLYCERIDE  ABGs:  No results for input(s): PHART, PDT0EOO, PO2ART, YAM9RKI, BEART, THGBART, R7OGAERJ, FNX8TUE in the last 72 hours. INR:   Recent Labs     08/28/20 1821   INR 1.09     Lactate: No results for input(s): LACTATE in the last 72 hours. Cultures:  -----------------------------------------------------------------  RAD:   CT KNEE RIGHT WO CONTRAST   Final Result      1. Comminuted, displaced and angulated fracture involving the distal diametaphysis of right femur   2. Severe osteoarthritis      XR CHEST PORTABLE   Final Result      Chronic interstitial lung disease, acute on chronic process not excluded, but no dense focal consolidation. If desired CT of chest may be helpful for further characterization. XR KNEE RIGHT (3 VIEWS)   Final Result      No acute injury. RIGHT KNEE:      REASON FOR EXAM: Trauma, pain      FINDINGS:      Somewhat obliqued AP and crosstable lateral views of the right knee again demonstrate long stem intramedullary nail within the right femur with single distal interlocking screw.  Comminuted fracture of the distal femoral diaphysis in the region of the distal aspect of the intramedullary nail with varus angulation. No additional fractures. Knee joint maintained. IMPRESSION:      Comminuted angulated fracture of the distal femoral diaphysis. XR HIP 2-3 VW W PELVIS RIGHT   Final Result      No acute injury. RIGHT KNEE:      REASON FOR EXAM: Trauma, pain      FINDINGS:      Somewhat obliqued AP and crosstable lateral views of the right knee again demonstrate long stem intramedullary nail within the right femur with single distal interlocking screw. Comminuted fracture of the distal femoral diaphysis in the region of the    distal aspect of the intramedullary nail with varus angulation. No additional fractures. Knee joint maintained. IMPRESSION:      Comminuted angulated fracture of the distal femoral diaphysis. Assessment/Plan:   Man Pagan is an 81yo F w a PMH of dementia and CVA p/w right knee swelling. Pt does not recall how the event occurred and  noticed the swelling and brought her to the ED.     Right Femoral Fracture:  XR right knee showing comminuted angulated fracture of the distil femoral diaphysis. Ortho involved. - CT right knee w/o contrast: Comminuted, displaced and angulated fracture involving the distal diametaphysis of right femur, Severe osteoarthritis   - f/u Ortho recs  - surgery possibly today. COVID pending.  - acetaminophen q6h prn  - IVF NS @75  - NPO since midnight     Anemia:  HGB 10.3  - follow H&H    Chronic Interstial lung disease  CXR with Chronic interstitial lung disease, acute on chronic process not excluded, but no dense focal consolidation. -asymptomatic, no rx complaints, no hypoxia  -COVID ruleout.  -consider CT of chest for further characterization.     Hypothyroidism  -25 mcg daily  -measure TSH, Free T4    History of constipation  -start home docusate 100 mg BID    Restless Leg syndrome  -restart home requip 1 mg TID    COVID-19 rule out  -covid pending  -droplet plus        Code Status: Full Code  FEN: Diet NPO, After Midnight, NS @ 75ml/hr  PPX: SCDs, hold eliquis   DISPO: Merlene Malone MD, PGY-1  08/29/20  7:25 AM    This patient has been staffed and discussed with Asia Alonzo MD.

## 2020-08-29 NOTE — PROGRESS NOTES
Patient resting comfortable, was fidgety earlier @ times and keeps repeating \"I'm sorry\"  back for brief moment, son called and stated he spoke to surgeon. Huizar placed in OR, informed only 10 ml urine returned but patient was incontinent prior. Scant amount of urine noted in urometer. Rn on floor given report and instructed to monitor huizar output.

## 2020-08-29 NOTE — ANESTHESIA PRE PROCEDURE
Department of Anesthesiology  Preprocedure Note       Name:  Keith Benavidez   Age:  80 y.o.  :  1935                                          MRN:  [id number]         Date:  2020      Surgeon: Trevin Silverman):  Gracie Ortiz MD    Procedure: Procedure(s):  RIGHT FEMUR OPEN REDUCTION INTERNAL FIXATION    Medications prior to admission:   Prior to Admission medications    Medication Sig Start Date End Date Taking? Authorizing Provider   docusate sodium (COLACE) 100 MG capsule Take 100 mg by mouth 2 times daily   Yes Historical Provider, MD   loratadine (CLARITIN) 10 MG tablet Take 10 mg by mouth every evening   Yes Historical Provider, MD   LORazepam (ATIVAN) 0.5 MG tablet Take 0.5 mg by mouth 2 times daily. Yes Historical Provider, MD   calcium carbonate (OSCAL) 500 MG TABS tablet Take 500 mg by mouth 2 times daily   Yes Historical Provider, MD   polyethylene glycol (MIRALAX) 17 g PACK packet Take 17 g by mouth daily   Yes Historical Provider, MD   bisacodyl (DULCOLAX) 10 MG suppository Place 10 mg rectally nightly as needed for Constipation (no bowel movement x3 days)   Yes Historical Provider, MD   guaiFENesin (ROBITUSSIN) 100 MG/5ML SOLN oral solution Take 200 mg by mouth every 6 hours as needed for Cough   Yes Historical Provider, MD   loperamide (IMODIUM) 2 MG capsule Take 2 mg by mouth 4 times daily as needed for Diarrhea   Yes Historical Provider, MD   oxymetazoline (AFRIN) 0.05 % nasal spray 2 sprays by Nasal route 2 times daily as needed for Congestion   Yes Historical Provider, MD   oxyCODONE-acetaminophen (PERCOCET) 5-325 MG per tablet Take 1 tablet by mouth every 4 hours as needed for Pain.    Yes Historical Provider, MD   apixaban (ELIQUIS) 2.5 MG TABS tablet Take 2.5 mg by mouth 2 times daily   Yes Historical Provider, MD   fluvoxaMINE (LUVOX) 50 MG tablet Take 75 mg by mouth nightly    Yes Historical Provider, MD   levothyroxine (SYNTHROID) 25 MCG tablet Take 25 mcg by mouth Daily Yes Historical Provider, MD   melatonin 3 MG TABS tablet Take 3 mg by mouth nightly    Yes Historical Provider, MD   rOPINIRole (REQUIP) 2 MG tablet Take 1 mg by mouth 3 times daily    Yes Historical Provider, MD   acetaminophen (TYLENOL) 500 MG tablet Take 1,000 mg by mouth every 6 hours as needed for Pain    Yes Historical Provider, MD   LORazepam (ATIVAN) 0.5 MG tablet Take 0.5 mg by mouth every 8 hours as needed for Anxiety.     Yes Historical Provider, MD   dextromethorphan-guaiFENesin ([place] [address] DM)  MG per extended release tablet Take 1 tablet by mouth every 12 hours as needed   Yes Historical Provider, MD   diclofenac sodium 1 % GEL Apply 2 g topically 4 times daily as needed    Yes Historical Provider, MD   senna (SENOKOT) 8.6 MG tablet Take 1 tablet by mouth every 12 hours as needed for Constipation   Yes Historical Provider, MD   metoprolol (TOPROL XL) 25 MG XL tablet Take 50 mg by mouth daily    Yes Historical Provider, MD       Current medications:    Current Facility-Administered Medications   Medication Dose Route Frequency Provider Last Rate Last Dose    rOPINIRole (REQUIP) tablet 1 mg  1 mg Oral TID Naima Alberto MD        docusate sodium (COLACE) capsule 100 mg  100 mg Oral BID Naima Alberto MD        sodium chloride flush 0.9 % injection 10 mL  10 mL Intravenous 2 times per day Vera Dallas, DO   10 mL at 08/28/20 2123    sodium chloride flush 0.9 % injection 10 mL  10 mL Intravenous PRN Vera Dallas, DO        acetaminophen (TYLENOL) tablet 650 mg  650 mg Oral Q6H PRN Vera Dallas, DO        Or    acetaminophen (TYLENOL) suppository 650 mg  650 mg Rectal Q6H PRN Vera Dallas, DO        polyethylene glycol (GLYCOLAX) packet 17 g  17 g Oral Daily PRN Vera Dallas, DO        promethazine (PHENERGAN) tablet 12.5 mg  12.5 mg Oral Q6H PRN Vera Dallas, DO        Or    ondansetron (ZOFRAN) injection 4 mg  4 mg Intravenous Q6H PRN Vera Dallas, DO        0.9 % sodium chloride infusion   Intravenous Continuous Linnea Grim, DO 75 mL/hr at 08/29/20 1247      levothyroxine (SYNTHROID) tablet 25 mcg  25 mcg Oral Daily Linnea Grim, DO        melatonin tablet 3 mg  3 mg Oral Nightly PRN Linnea Grim, DO        fluvoxaMINE (LUVOX) tablet 75 mg  75 mg Oral Nightly Fabrice Min MD           Allergies: Allergies   Allergen Reactions    Bactrim     Erythromycin     Penicillins     Sulfa Antibiotics        Problem List:    Patient Active Problem List   Diagnosis Code    Pain and swelling of right knee M25.561, M25.461       Past Medical History:        Diagnosis Date    Bleeding ulcer 1988 and 1989    Carpal tunnel syndrome     [facility] [address]     Stroke ([facility] [address] 1-1-11       Past Surgical History:        Procedure Laterality Date    ROTATOR CUFF REPAIR  2004    left shoulder    WRIST FRACTURE SURGERY  05/2009    left wrist       Social History:    Social History     Tobacco Use    Smoking status: Never Smoker   Substance Use Topics    Alcohol use: Yes                                Counseling given: Not Answered      Vital Signs (Current):   Vitals:    08/29/20 0004 08/29/20 0451 08/29/20 0741 08/29/20 1052   BP: 109/69 113/80 108/66 131/70   Pulse: 83 91 62 97   Resp: 16 16 16 18   Temp: 98.6 °F (37 °C) 99.9 °F (37.7 °C) 99 °F (37.2 °C) 96.2 °F (35.7 °C)   TempSrc: Oral Axillary Axillary Axillary   SpO2: 98% 92% 94%    Weight:                                                  BP Readings from Last 3 Encounters:   08/29/20 131/70   06/18/18 116/89   02/01/11 150/81       NPO Status:                                                                                 BMI:   Wt Readings from Last 3 Encounters:   08/28/20 146 lb 2.6 oz (66.3 kg)   06/18/18 152 lb (68.9 kg)   02/01/11 154 lb (69.9 kg)     Body mass index is 22.22 kg/m².     CBC:   Lab Results   Component Value Date    WBC 9.1 08/29/2020    RBC 3.13 08/29/2020    HGB 10.0 08/29/2020    HCT 29.4 08/29/2020    MCV 94.1 08/29/2020    RDW 14.4 08/29/2020     08/29/2020       CMP:   Lab Results   Component Value Date     08/29/2020    K 4.0 08/29/2020     08/29/2020    CO2 25 08/29/2020    BUN 21 08/29/2020    CREATININE 0.8 08/29/2020    GFRAA >60 08/29/2020    LABGLOM >60 08/29/2020    GLUCOSE 102 08/29/2020    CALCIUM 8.6 08/29/2020       POC Tests: No results for input(s): POCGLU, POCNA, POCK, POCCL, POCBUN, POCHEMO, POCHCT in the last 72 hours. Coags:   Lab Results   Component Value Date    PROTIME 12.7 08/28/2020    INR 1.09 08/28/2020    APTT 32.1 08/28/2020       HCG (If Applicable): No results found for: PREGTESTUR, PREGSERUM, HCG, HCGQUANT     ABGs: No results found for: PHART, PO2ART, DOS9XIL, GIH5MEG, BEART, W2TVHIBD     Type & Screen (If Applicable):  No results found for: LABABO, LABRH    Drug/Infectious Status (If Applicable):  No results found for: HIV, HEPCAB    COVID-19 Screening (If Applicable): No results found for: COVID19      Anesthesia Evaluation    Airway: Mallampati: Unable to assess / NA        Dental:          Pulmonary:       (-) asthma                           Cardiovascular:  Exercise tolerance: poor (<4 METS),                     Neuro/Psych:   (+) CVA: no interval change, neuromuscular disease:, psychiatric history:   (-) seizures           GI/Hepatic/Renal:   (+) GERD:,           Endo/Other:                     Abdominal:           Vascular:                                    Anesthesia Plan      general     ASA 3 - emergent     (-npo MNper nursing  -pt also will not permit me to evaluate her airway at this time  -confused; does not answer apropriately . Attempted to call /son without success.  !!!!!!!!!!! !!All history obtained from chart)  Induction: intravenous. MIPS: Postoperative opioids intended. Anesthetic plan and risks discussed with patient.                   Misty Miranda MD   8/29/2020

## 2020-08-29 NOTE — PROGRESS NOTES
4 Eyes Admission Assessment     I agree as the admission nurse that 2 RN's have performed a thorough Head to Toe Skin Assessment on the patient. ALL assessment sites listed below have been assessed on admission. Areas assessed by both nurses: yes  []   Head, Face, and Ears   []   Shoulders, Back, and Chest  []   Arms, Elbows, and Hands   []   Coccyx, Sacrum, and Ischium  []   Legs, Feet, and Heels        Does the Patient have Skin Breakdown? No         Jv Prevention initiated:  No   Wound Care Orders initiated:  No      [facility] nurse consulted for Pressure Injury (Stage 3,4, Unstageable, DTI, NWPT, and Complex wounds) or Jv score 18 or lower:  No      Nurse 1 eSignature: . Electronically signed by Fede Hirsch RN on 8/29/2020 at 5:11 AM      **SHARE this note so that the co-signing nurse is able to place an eSignature**    Nurse 2 eSignature: Electronically signed by Rebecca Rogel RN on 8/28/20 at 9:11 PM EDT

## 2020-08-29 NOTE — CONSULTS
[facility]   Orthopaedic Surgery Consult Note        CHIEF COMPLAINT:  Right leg pain    Reason for Consult:  Right distal femur fracture, below previous cephalomedullary nail    HISTORY OF PRESENT ILLNESS:    The patient is a 80 y.o. female who presents with the above injury. The patient has dementia. There was no witness to the injury but the presumed mechanism is a fall from the bed. The pain is constant, aching, non-radiating, and not associated with any numbness or tingling. It is exacerbated by motion and relieved by rest. It has not improved with time. Past Medical History:    Past Medical History:   Diagnosis Date    Bleeding ulcer 1988 and 1989    Carpal tunnel syndrome     [facility] [address]     Stroke ([facility] [address] 1-1-11       Past Surgical History:    Past Surgical History:   Procedure Laterality Date    ROTATOR CUFF REPAIR  2004    left shoulder    WRIST FRACTURE SURGERY  05/2009    left wrist       Current Medications:   Scheduled Meds:   rOPINIRole  1 mg Oral TID    docusate sodium  100 mg Oral BID    sodium chloride flush  10 mL Intravenous 2 times per day    levothyroxine  25 mcg Oral Daily    fluvoxaMINE  75 mg Oral Nightly     Continuous Infusions:   sodium chloride 75 mL/hr at 08/29/20 1247     PRN Meds:.sodium chloride flush, acetaminophen **OR** acetaminophen, polyethylene glycol, promethazine **OR** ondansetron, melatonin    Allergies:  Bactrim; Erythromycin;  Penicillins; and Sulfa antibiotics    Social History:   Social History     Socioeconomic History    Marital status:      Spouse name: Not on file    Number of children: Not on file    Years of education: Not on file    Highest education level: Not on file   Occupational History    Occupation: reitred   Social Needs    Financial resource strain: Not on file    Food insecurity     Worry: Not on file     Inability: Not on file   [employer] needs     Medical: Not on file     Non-medical: Not on file lesions. No significant pain with passive range of motion of hip, knee and ankle. Strength grossly intact throughout. Skin warm and well perfused with brisk capillary refill and sensation intact throughout. Imaging:  X-rays and CT of the right distal femur were reviewed. Comminuted displaced fracture of the distal femur, around previous cephalomedullary nail. No extension to knee joint. IMPRESSION/RECOMMENDATIONS:    80 y.o. female with right distal femur fracture, below previous cephalomedullary nail    Plan was discussed with patient's son and  via phone last evening. I discussed the findings in detail and explained my plan. The family is in agreement. To OR for right femur ORIF, likely removal of distal interlocking screw of previous nail, all other indicated procedures. Ashlee Altman.  Aicha Gutierrez MD

## 2020-08-29 NOTE — CONSULTS
List of Home Medications the patient is currently taking is complete. Home Medication list in EPIC updated to reflect changes noted below. Source of medications in list is [facility] medications list.      The following medications were added to admission medication list:  · Docusate 100 mg BID  · Loratadine 10 mg daily  · Calcium 500 mg BID  · PEG 17g daily  · Bisacodyl 10 mg supp HS PRN  · Guaifenesin liquid q6h PRN  · Loperamide 2 mg q6h PRN  · Oxymetazoline BID PRN  · Oxy-APAP 5-325 mg q4h PRN    The following medications were removed from admission medication list:  · Rabeprazole  · Estrogen vaginal cream  · Alendronate-D  · MagOx  · Ranitidine  · Vitamin C    The following medication instructions/doses were adjusted to reflect how patient is taking:  · Fluvoxamine 50 mg daily adjusted to 75 mg daily  · Melatonin 3 mg HS PRN adjusted to 3 mg HS  · Ropinirole 2 mg TID adjusted to 1 mg TID  · APAP 500 mg q6h PRN adjusted to 1g q6h PRN  · Lorazepam 0.5 mg q6h PRN adjusted to 0.5 mg q8h PRN  · Diclofenac 1% gel BID adjusted to q6h PRN    Please call with questions.   Iam Bolanos, PharmD, BCPS  Main pharmacy: [id number]  8/28/2020 9:14 PM

## 2020-08-29 NOTE — PLAN OF CARE
Problem: Falls - Risk of:  Goal: Will remain free from falls  Description: Will remain free from falls  8/29/2020 0904 by Dashawn Fowler RN  Outcome: Met This Shift  Note: Side rails up x 2 call light in reach bed in low position bed alarm on , camera in place     Problem: Falls - Risk of:  Goal: Absence of physical injury  Description: Absence of physical injury  8/29/2020 0904 by Dashawn Fowler RN  Outcome: Met This Shift     Problem: Skin Integrity:  Goal: Will show no infection signs and symptoms  Description: Will show no infection signs and symptoms  8/29/2020 0904 by Dashawn Fowler RN  Outcome: Met This Shift  Note: Q2hr turn. Rt. Thigh swollen , offers no c/o pain      Problem: Skin Integrity:  Goal: Absence of new skin breakdown  Description: Absence of new skin breakdown  8/29/2020 0904 by Dashawn Fowler RN  Outcome: Met This Shift     Problem: Pain:  Description: Pain management should include both nonpharmacologic and pharmacologic interventions. Goal: Pain level will decrease  Description: Pain level will decrease  8/29/2020 0904 by Dashawn Fowler RN  Outcome: Met This Shift  Note: Denies c/o pain calm quiet     Problem: Pain:  Description: Pain management should include both nonpharmacologic and pharmacologic interventions. Goal: Control of acute pain  Description: Control of acute pain  8/29/2020 0904 by Dashawn Fowler RN  Outcome: Met This Shift     Problem: Pain:  Description: Pain management should include both nonpharmacologic and pharmacologic interventions.   Goal: Control of chronic pain  Description: Control of chronic pain  8/29/2020 0904 by Dashawn Fowler RN  Outcome: Met This Shift     Problem: Gas Exchange - Impaired  Goal: Promote optimal lung function  8/29/2020 0904 by Dashawn Fowler RN  Outcome: Met This Shift  Note: Droplet +/air born isolation in place lungs diminished, 99 temp no cough, on room air sats 94%     Problem: Breathing Pattern - Ineffective  Goal: Ability to achieve and maintain a regular respiratory rate  8/29/2020 0904 by Lisa Sanford RN  Outcome: Met This Shift  Note: Rsp.  Easy on room air

## 2020-08-29 NOTE — OP NOTE
PREOPERATIVE DIAGNOSIS: Right distal femur fracture, below previous cephalomedullary nail    POSTOPERATIVE DIAGNOSIS: Right comminuted displaced distal femur fracture, below previous cephalomedullary nail    OPERATION PERFORMED:     Right distal femur open reduction internal fixation  Right femur removal of hardware (distal interlocking screw)    SURGEON: Fifi Roger MD    ESTIMATED BLOOD LOSS: 260 mL    COMPLICATIONS: None. SPECIMENS: None. IMPLANTS:  Minnie 18 hole NCB plate    ANTIOBIOTICS: Cefazolin 2G administered preoperatively  ? INDICATIONS FOR THE PROCEDURE:   Isacc Hatch is a 80 y.o. female patient who sustained the above noted injury in a presumed low energy fall. The patient was transported to the emergency department where x-rays demonstrated the above fracture. I was consulted as the Orthopedic surgeon on-call regarding their injury. I discussed with the patient and their family the risks and benefits of nonoperative versus operative management. We discussed alternatives to operative management. The risks of non-operative care would include persistent pain. The standard risk of operation applies with risk of anesthesia, risk of medical complication, damage to surrounding structures, blood clot, risk of infection, further fracture or implant failure. After a full discussion of the risks, benefits, and alternatives to surgery, the patient and family did elect to proceed. ?  DESCRIPTION OF THE PROCEDURE:   The patient's operative leg was identified and marked with an indelible marker. The patient was transported to the operating room where general anesthesia was obtained. The patient was placed on a flat dino table. All bony prominences were padded. The operative lower extremity was then prepped and draped in a normal standard sterile surgical fashion. A final time-out was performed verifying correct patient, operative site, and operative plan.      A longitudinal incision was made along the distal femur. Dissection was carried down through the IT band, muscle and fascia to the fracture. Fracture hematoma was evacuated. Hemostasis was maintained with Bovie and aquamantys. The fracture was exposed and severe comminution was noted. The distal interlocking screw of the nail was encountered. Soft tissue was cleared from the head of the screw and then the screw was removed with the appropriate . The entire screw was removed, thus completing the hardware removal.     Using a radiolucent triangle, the leg was placed in traction and fluoroscopy was used to determine appropriate reduction. Then, the above noted plate was placed laterally on the femur and slid up to the level of the lesser trochanter. It was pinned in place distally. Once appropriate position was noted on fluoro, a cable was passed around the plate, femur and previous nail. Manual reduction maneuvers were used to reduce the fracture appropriately. Then, once the fracture reduction was adequate, the plate was adjusted and finally screws were placed above and below the fracture. A total of five screws were placed distally. Proximally. 4 screws were able to be placed around the existing nail, 2 anterior and 2 posterior. This was accomplished by careful angling of the drill around the nail under fluoro, utilizing the peripheral holes of the plate. Finally, one additional screw was placed percutaneously more proximally, thus achieving 5 screws and one cable above the fracture site. Then, locking caps were used to secure all screws except the percutaneously placed screw, which was left as a cortical screw. 3    Final fluoroscopy was used to confirm appropriate plate position, fracture reduction, and screw lengths.      Of note, the distal femur fracture open reduction internal fixation portion of the procedure took twice as long as typical for a distal femur fracture, due the pre-existing nail within the femoral canal which required significant adjustment in surgical technique, additional fluoroscopy for guidance, and more exposure anteriorly and posteriorly in order to place the peripheral screws, and the cable, which would not typically be required in a standard distal femur fracture surgery. I then turned my attention to closure, all the wounds were copiously irrigated with sterile saline. 1 gram and vancomycin powder was placed. Closure was accomplished with 0 Vicryl in the muscle and fascia followed by #1 Stratafix, 2-0 Stratafix buried in the subdermal tissue, and staples in the skin. Sterile Mepilex dressings were applied over top of the staples. The patient was removed from the table and transported to the postoperative anesthesia care unit in stable condition. ? Plan will be non weight bearing on the right lower extremity. Knee range of motion 0 - 90 degrees. Resume baseline Eliquis tomorrow for DVT ppx. All sponge and needle counts were correct x2.

## 2020-08-29 NOTE — PLAN OF CARE
Problem: Pain:  Description: Pain management should include both nonpharmacologic and pharmacologic interventions. Goal: Pain level will decrease  Description: Pain level will decrease  Outcome: Ongoing   Pt denies pain throughout shift. Vital signs stable. Pt resting comfortably in bed continue to monitor. Problem: Skin Integrity:  Goal: Will show no infection signs and symptoms  Description: Will show no infection signs and symptoms  Outcome: Ongoing   Pt with no new skin breakdown during this shift. Pt's skin assessed with  each shift assessment. Pt turned and repositioned as scheduled. Will continue to monitor. Problem: Gas Exchange - Impaired  Goal: Promote optimal lung function  Outcome: Ongoing   Pt able to maintain respiration between 16-18 beats per minute, oxygen saturation greater then 90% without supplemental oxygen. Pt sleeping comfortably in bed .

## 2020-08-30 LAB
ANION GAP SERPL CALCULATED.3IONS-SCNC: 9 MMOL/L (ref 3–16)
BASOPHILS ABSOLUTE: 0 K/UL (ref 0–0.2)
BASOPHILS RELATIVE PERCENT: 0.4 %
BUN BLDV-MCNC: 17 MG/DL (ref 7–20)
CALCIUM SERPL-MCNC: 8.2 MG/DL (ref 8.3–10.6)
CHLORIDE BLD-SCNC: 108 MMOL/L (ref 99–110)
CO2: 24 MMOL/L (ref 21–32)
CREAT SERPL-MCNC: 0.8 MG/DL (ref 0.6–1.2)
EKG ATRIAL RATE: 90 BPM
EKG DIAGNOSIS: NORMAL
EKG P AXIS: 39 DEGREES
EKG P-R INTERVAL: 162 MS
EKG Q-T INTERVAL: 346 MS
EKG QRS DURATION: 82 MS
EKG QTC CALCULATION (BAZETT): 423 MS
EKG R AXIS: -13 DEGREES
EKG T AXIS: 6 DEGREES
EKG VENTRICULAR RATE: 90 BPM
EOSINOPHILS ABSOLUTE: 0 K/UL (ref 0–0.6)
EOSINOPHILS RELATIVE PERCENT: 0 %
GFR AFRICAN AMERICAN: >60
GFR NON-AFRICAN AMERICAN: >60
GLUCOSE BLD-MCNC: 138 MG/DL (ref 70–99)
HCT VFR BLD CALC: 22.1 % (ref 36–48)
HCT VFR BLD CALC: 23.3 % (ref 36–48)
HEMOGLOBIN: 7.6 G/DL (ref 12–16)
HEMOGLOBIN: 8 G/DL (ref 12–16)
LYMPHOCYTES ABSOLUTE: 0.3 K/UL (ref 1–5.1)
LYMPHOCYTES RELATIVE PERCENT: 3.4 %
MCH RBC QN AUTO: 31.9 PG (ref 26–34)
MCHC RBC AUTO-ENTMCNC: 34.4 G/DL (ref 31–36)
MCV RBC AUTO: 92.9 FL (ref 80–100)
MONOCYTES ABSOLUTE: 0.8 K/UL (ref 0–1.3)
MONOCYTES RELATIVE PERCENT: 8.6 %
NEUTROPHILS ABSOLUTE: 8 K/UL (ref 1.7–7.7)
NEUTROPHILS RELATIVE PERCENT: 87.6 %
PDW BLD-RTO: 14.2 % (ref 12.4–15.4)
PLATELET # BLD: 190 K/UL (ref 135–450)
PMV BLD AUTO: 8.1 FL (ref 5–10.5)
POTASSIUM REFLEX MAGNESIUM: 4.5 MMOL/L (ref 3.5–5.1)
RBC # BLD: 2.5 M/UL (ref 4–5.2)
SODIUM BLD-SCNC: 141 MMOL/L (ref 136–145)
WBC # BLD: 9.1 K/UL (ref 4–11)

## 2020-08-30 PROCEDURE — 80048 BASIC METABOLIC PNL TOTAL CA: CPT

## 2020-08-30 PROCEDURE — 85025 COMPLETE CBC W/AUTO DIFF WBC: CPT

## 2020-08-30 PROCEDURE — 6370000000 HC RX 637 (ALT 250 FOR IP): Performed by: ORTHOPAEDIC SURGERY

## 2020-08-30 PROCEDURE — 2580000003 HC RX 258: Performed by: COUNSELOR

## 2020-08-30 PROCEDURE — 1200000000 HC SEMI PRIVATE

## 2020-08-30 PROCEDURE — 6360000002 HC RX W HCPCS: Performed by: ORTHOPAEDIC SURGERY

## 2020-08-30 PROCEDURE — 85014 HEMATOCRIT: CPT

## 2020-08-30 PROCEDURE — 85018 HEMOGLOBIN: CPT

## 2020-08-30 PROCEDURE — 93010 ELECTROCARDIOGRAM REPORT: CPT | Performed by: INTERNAL MEDICINE

## 2020-08-30 PROCEDURE — 2580000003 HC RX 258: Performed by: ORTHOPAEDIC SURGERY

## 2020-08-30 RX ORDER — 0.9 % SODIUM CHLORIDE 0.9 %
500 INTRAVENOUS SOLUTION INTRAVENOUS ONCE
Status: COMPLETED | OUTPATIENT
Start: 2020-08-30 | End: 2020-08-30

## 2020-08-30 RX ORDER — CEFADROXIL 1000 MG/1
1 TABLET ORAL 2 TIMES DAILY
Qty: 14 TABLET | Refills: 0 | Status: SHIPPED | OUTPATIENT
Start: 2020-08-30 | End: 2020-08-31 | Stop reason: HOSPADM

## 2020-08-30 RX ORDER — CEPHALEXIN 250 MG/1
250 CAPSULE ORAL EVERY 6 HOURS SCHEDULED
Status: DISCONTINUED | OUTPATIENT
Start: 2020-08-30 | End: 2020-09-01 | Stop reason: HOSPADM

## 2020-08-30 RX ORDER — OXYCODONE HYDROCHLORIDE 5 MG/1
5 TABLET ORAL EVERY 6 HOURS PRN
Qty: 20 TABLET | Refills: 0 | Status: SHIPPED | OUTPATIENT
Start: 2020-08-30 | End: 2020-09-04

## 2020-08-30 RX ADMIN — CEFAZOLIN 2 G: 10 INJECTION, POWDER, FOR SOLUTION INTRAVENOUS at 06:15

## 2020-08-30 RX ADMIN — APIXABAN 2.5 MG: 2.5 TABLET, FILM COATED ORAL at 22:26

## 2020-08-30 RX ADMIN — DOCUSATE SODIUM 50MG AND SENNOSIDES 8.6MG 1 TABLET: 8.6; 5 TABLET, FILM COATED ORAL at 06:00

## 2020-08-30 RX ADMIN — APIXABAN 2.5 MG: 2.5 TABLET, FILM COATED ORAL at 08:18

## 2020-08-30 RX ADMIN — LEVOTHYROXINE SODIUM 25 MCG: 0.03 TABLET ORAL at 06:16

## 2020-08-30 RX ADMIN — ROPINIROLE HYDROCHLORIDE 1 MG: 1 TABLET, FILM COATED ORAL at 08:18

## 2020-08-30 RX ADMIN — CEPHALEXIN 250 MG: 250 CAPSULE ORAL at 18:50

## 2020-08-30 RX ADMIN — DOCUSATE SODIUM 50MG AND SENNOSIDES 8.6MG 1 TABLET: 8.6; 5 TABLET, FILM COATED ORAL at 22:08

## 2020-08-30 RX ADMIN — DOCUSATE SODIUM 100 MG: 100 CAPSULE, LIQUID FILLED ORAL at 08:17

## 2020-08-30 RX ADMIN — SODIUM CHLORIDE 500 ML: 900 INJECTION, SOLUTION INTRAVENOUS at 20:30

## 2020-08-30 RX ADMIN — SODIUM CHLORIDE: 9 INJECTION, SOLUTION INTRAVENOUS at 10:48

## 2020-08-30 RX ADMIN — OXYCODONE 5 MG: 5 TABLET ORAL at 10:52

## 2020-08-30 RX ADMIN — ROPINIROLE HYDROCHLORIDE 1 MG: 1 TABLET, FILM COATED ORAL at 22:08

## 2020-08-30 RX ADMIN — CEPHALEXIN 250 MG: 250 CAPSULE ORAL at 13:35

## 2020-08-30 RX ADMIN — DOCUSATE SODIUM 100 MG: 100 CAPSULE, LIQUID FILLED ORAL at 22:08

## 2020-08-30 RX ADMIN — ROPINIROLE HYDROCHLORIDE 1 MG: 1 TABLET, FILM COATED ORAL at 15:17

## 2020-08-30 ASSESSMENT — PAIN SCALES - PAIN ASSESSMENT IN ADVANCED DEMENTIA (PAINAD)
CONSOLABILITY: 0
BREATHING: 0
FACIALEXPRESSION: 2
BREATHING: 0
CONSOLABILITY: 0
BODYLANGUAGE: 0
NEGVOCALIZATION: 0
TOTALSCORE: 0
BREATHING: 0
FACIALEXPRESSION: 0
FACIALEXPRESSION: 0
CONSOLABILITY: 0
TOTALSCORE: 0
NEGVOCALIZATION: 0
NEGVOCALIZATION: 0
NEGVOCALIZATION: 2
BODYLANGUAGE: 0
BREATHING: 0
BREATHING: 0
BODYLANGUAGE: 0
BREATHING: 0
CONSOLABILITY: 2
BODYLANGUAGE: 0
NEGVOCALIZATION: 0
NEGVOCALIZATION: 0
BODYLANGUAGE: 0
BODYLANGUAGE: 0
TOTALSCORE: 0
TOTALSCORE: 0
NEGVOCALIZATION: 0
BREATHING: 0
CONSOLABILITY: 0
BREATHING: 0
BODYLANGUAGE: 0
FACIALEXPRESSION: 0
TOTALSCORE: 0
CONSOLABILITY: 0
BREATHING: 0
BODYLANGUAGE: 0
BODYLANGUAGE: 0
NEGVOCALIZATION: 0
FACIALEXPRESSION: 0
CONSOLABILITY: 0
CONSOLABILITY: 0
FACIALEXPRESSION: 0
BODYLANGUAGE: 1
BREATHING: 0
BREATHING: 0
TOTALSCORE: 0
CONSOLABILITY: 0
FACIALEXPRESSION: 0
TOTALSCORE: 7
CONSOLABILITY: 0
FACIALEXPRESSION: 0
TOTALSCORE: 0
NEGVOCALIZATION: 0
FACIALEXPRESSION: 0
BODYLANGUAGE: 0
CONSOLABILITY: 0
FACIALEXPRESSION: 0
FACIALEXPRESSION: 0

## 2020-08-30 ASSESSMENT — PAIN SCALES - GENERAL
PAINLEVEL_OUTOF10: 0
PAINLEVEL_OUTOF10: 7

## 2020-08-30 NOTE — PLAN OF CARE
Problem: Falls - Risk of:  Goal: Will remain free from falls  Description: Will remain free from falls  8/30/2020 0504 by Awilda Marsh RN  Outcome: Ongoing  8/30/2020 0451 by Awilda Marsh RN  Outcome: Ongoing  Goal: Absence of physical injury  Description: Absence of physical injury  8/30/2020 0504 by Awilda Marsh RN  Outcome: Ongoing  8/30/2020 0451 by Awilda Marsh RN  Outcome: Ongoing     Problem: Skin Integrity:  Goal: Will show no infection signs and symptoms  Description: Will show no infection signs and symptoms  8/30/2020 0504 by Awilda Marsh RN  Outcome: Ongoing  8/30/2020 0451 by Awilda Marsh RN  Outcome: Ongoing  Goal: Absence of new skin breakdown  Description: Absence of new skin breakdown  8/30/2020 0504 by Awilda Marsh RN  Outcome: Ongoing  8/30/2020 0451 by Awilda Marsh RN  Outcome: Ongoing     Problem: Pain:  Goal: Pain level will decrease  Description: Pain level will decrease  8/30/2020 0504 by Awilda Marsh RN  Outcome: Ongoing  8/30/2020 0451 by Awilda Marsh RN  Outcome: Ongoing  Goal: Control of acute pain  Description: Control of acute pain  8/30/2020 0504 by Awilda Marsh RN  Outcome: Ongoing  8/30/2020 0451 by Awilda Marsh RN  Outcome: Ongoing  Goal: Control of chronic pain  Description: Control of chronic pain  8/30/2020 0504 by Awilda Marsh RN  Outcome: Ongoing  8/30/2020 0451 by Awilda Marsh RN  Outcome: Ongoing

## 2020-08-30 NOTE — PROGRESS NOTES
Physician Progress Note      Elvi Burnham  [facility] #:                  [id number]  :                       1935  ADMIT DATE:       2020 3:24 PM  DISCH DATE:  RESPONDING  PROVIDER #:        Dipesh Chapman MD          QUERY TEXT:    Pt admitted with Right distal femur fracture and has anemia documented. If   possible, please document in progress notes and discharge summary further   specificity regarding the acuity and type of anemia:      The medical record reflects the following:  Risk Factors: 79 y/o with Hx of Anemia, femur fracture with Reposition of   femur fracture and internal fixation  Clinical Indicators: H/H  10/29.4 8 8.0/23.3   Treatment: IVF'S Labs, follow H&H- Transfuse Hb <7  Options provided:  -- Anemia due to acute on chronic blood loss  -- Anemia due to chronic blood loss  -- Anemia due to postoperative blood loss  -- Other - I will add my own diagnosis  -- Disagree - Not applicable / Not valid  -- Disagree - Clinically unable to determine / Unknown  -- Refer to Clinical Documentation Reviewer    PROVIDER RESPONSE TEXT:    This patient has anemia due to postoperative blood loss. Query created by: Frank Bergman on 2020 12:14 PM      QUERY TEXT:    Pt admitted with Right distal femur fracture. Pt noted to have severe   Osteoarthritis. If possible, please document in progress notes and discharge   summary if you are evaluating and/or treating any of the following: The medical record reflects the following:  Risk Factors: 79 y/o female with hx of Severe Osteoarthritis  Clinical Indicators: H&P:  \"Shoshana Tirado is a 79yo female presenting with   right knee swelling. She lives in a memory unit at a skilled nursing facility   and her  helped provide history. Neither party is sure exactly what   occurred; pt's  went to see his wife for his daily visit when he   noticed her knee swelling.  He asked what happened and the nurse said

## 2020-08-30 NOTE — PROGRESS NOTES
Ask pt multi times throughout shift if having any pain.  Pt states no/  Pt states no to any pain meds states I dont need that / cont to monitor for pain/ see flow sheet

## 2020-08-30 NOTE — CARE COORDINATION
Case Management Assessment           Initial Evaluation                Date / Time of Evaluation: 8/30/2020 1:12 PM                 Assessment Completed by: Shima Hancock    Patient Name: Christin Keith     YOB: 1935  Diagnosis: Pain and swelling of right knee [M25.561, M25.461]  Pain and swelling of right knee [M25.561, M25.461]     Date / Time: 8/28/2020  3:24 PM    Patient Admission Status: Inpatient    If patient is discharged prior to next notation, then this note serves as note for discharge by case management. Current PCP: Mahesh Paris MD  Clinic Patient: No    Chart Reviewed: Yes  Patient/ Family Interviewed: Yes    Initial assessment completed at bedside with: , Maicol Interiano and son [facility]    Hospitalization in the last 30 days: No    Emergency Contacts:  Extended Emergency Contact Information  Primary Emergency Contact: Chris Davis  Address: [address] [place] [address] Phone: [phone]  Relation: Spouse  Secondary Emergency Contact: Goran Rita Phone: [phone]  Relation: Child    Advance Directives:   Code Status: Full [address]: No    Financial  Payor: Malia Pitcher / Plan: MEDICARE PART A AND B / Product Type: *No Product type* /     Pre-cert required for SNF: No    Pharmacy    [facility]  [address] [phone] - F [phone]  [address] [facility]. [address]  [address] [address]  Phone: [phone] Fax: [phone]    [facility] [address] - F [phone]  [facility] [address] [address]  Phone: [phone] Fax: [phone]      Potential assistance Purchasing Medications:    Does Patient want to participate in local refill/ meds to beds program?:      Meds To Beds General Rules:  1. Can ONLY be done Monday- Friday between 8:30am-5pm  2.  Prescription(s) must be in pharmacy by placement. Pt has been to SNF before but he does not remember the name. He is agreeable for CM to call his son Nae Bullard. CM called son Nae Bullard [phone]. He is in agreement for his mother to go to SNF. She has been to [facility] in the past.  They would like a referral to there. CM will continue to follow for DCP needs. The Plan for Transition of Care is related to the following treatment goals of Pain and swelling of right knee [M25.561, M25.461]  Pain and swelling of right knee [M25.561, M25.461]    The Patient and/or patient representative Zina Patel and her family were provided with a choice of provider and agrees with the discharge plan Not Indicated    Freedom of choice list was provided with basic dialogue that supports the patient's individualized plan of care/goals and shares the quality data associated with the providers.  Not Indicated    Care Transition patient: No    Diana Hooks RN  [facility] [employer] [facility]  Case Management Department  Ph: [phone]

## 2020-08-30 NOTE — ANESTHESIA POSTPROCEDURE EVALUATION
Department of Anesthesiology  Postprocedure Note    Patient: Perry Hough  MRN: [id number]  YOB: 1935  Date of evaluation: 8/30/2020  Time:  6:41 AM     Procedure Summary     Date:  08/29/20 Room / Location:  [address]    Anesthesia Start:  9288 Anesthesia Stop:  1708    Procedure:  RIGHT DISTAL FEMUR OPEN REDUCTION INTERNAL FIXATION AND REMOVAL HARDWARE (Right Leg Upper) Diagnosis:  (RIGHT FEMOR FRACTURE)    Surgeon:  Tj Esteban MD Responsible Provider:  Alexandr Seals MD    Anesthesia Type:  general ASA Status:  3 - Emergent          Anesthesia Type: general    Kendal Phase I: Kendal Score: 8    Kendal Phase II:      Last vitals: Reviewed and per EMR flowsheets.        Anesthesia Post Evaluation    Patient location during evaluation: PACU  Patient participation: complete - patient participated  Level of consciousness: awake  Pain score: 2  Airway patency: patent  Nausea & Vomiting: no nausea and no vomiting  Complications: no  Cardiovascular status: hemodynamically stable  Respiratory status: acceptable  Hydration status: euvolemic

## 2020-08-30 NOTE — PROGRESS NOTES
[place] Progress Note        Subjective:  Pt is resting in bed with no complaints of discomfort. Blood pressure (!) 101/58, pulse 94, temperature 98.4 °F (36.9 °C), temperature source Axillary, resp. rate 18, weight 146 lb 2.6 oz (66.3 kg), SpO2 91 %.     PHYSICAL EXAM:   Right lower extremity  Dressing clean and dry  +EHL/FHL/ADF  Sensation intact to light touch distally  Skin warm and well perfused  2+ DP pulse    HgB:    Lab Results   Component Value Date    HGB 8.0 08/30/2020       ASSESSMENT AND PLAN:    80 y.o. female status post right distal femur ORIF, HWR    1:  NWB RLE  2:  Deep venous thrombosis prophylaxis: Baseline Eliquis  3:  Continue mobilization, physical therapy  4:  D/C Plan:  Likely SNF    Follow up with me in 10-14 days postoperatively for new x rays and staple removal      Rosie Cortez MD

## 2020-08-30 NOTE — PROGRESS NOTES
Progress Note    Admit Date: 8/28/2020  Day: 2  Diet: Dietary Nutrition Supplements: Standard High Calorie Oral Supplement  DIET GENERAL;    CC: right knee pain     Interval history:     Ms. Roxanna Flor had a right distal femur open reduction and internal fixation on 8/29/20. She is now POD#1. Hb this morning is 8.0 after surgery. Will continue to monitor HB. No acute complaints at this time. 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 She lives in a memory unit at a skilled nursing facility and hr  helped provide history. Neither party is sure exactly what occurred; pt's  went to see his wife for his daily visit when he noticed her knee swelling. He asked what happened and the nurse said they were getting an XR to evaluate. He saw her yesterday but did not appreciate the swelling then. He decided to then take his wife to the ED to get it looked at. Pt was unable to answer many questions but was not in pain. Attempted to call Pt's residence for further information but was unable to get ahold of anyone.      In the ED VSS, xray of the right knee revealed a comminuted angulated fracture of the distal femoral diaphysis. CT ordered to assess for joint involvement. Ortho consulted. CT Knee -  1. Comminuted, displaced and angulated fracture involving the distal diametaphysis of right femur   2. Severe osteoarthritis     CXR-  Chronic interstitial lung disease, acute on chronic process not excluded, but no dense focal consolidation. If desired CT of chest may be helpful for further characterization.       Medications:     Scheduled Meds:   rOPINIRole  1 mg Oral TID    docusate sodium  100 mg Oral BID    apixaban  2.5 mg Oral BID    sodium chloride flush  10 mL Intravenous 2 times per day    sennosides-docusate sodium  1 tablet Oral BID    sodium chloride flush  10 mL Intravenous 2 times per day    levothyroxine 25 mcg Oral Daily    fluvoxaMINE  75 mg Oral Nightly     Continuous Infusions:   sodium chloride 75 mL/hr at 08/29/20 1741     PRN Meds:sodium chloride flush, magnesium hydroxide, oxyCODONE, sodium chloride flush, acetaminophen **OR** acetaminophen, polyethylene glycol, promethazine **OR** ondansetron, melatonin    Objective:   Vitals:   T-max:  Patient Vitals for the past 8 hrs:   BP Temp Temp src Pulse Resp SpO2   08/30/20 0356 107/71 98.1 °F (36.7 °C) Oral 95 18 98 %   08/30/20 0014 107/65 99 °F (37.2 °C) Axillary 90 17 96 %       Intake/Output Summary (Last 24 hours) at 8/30/2020 0713  Last data filed at 8/30/2020 0645  Gross per 24 hour   Intake 1870 ml   Output 775 ml   Net 1095 ml       Review of Systems:  Constitutional: No fevers, chills  HENT: No congestion, dysphagia  Eyes: No visual changes, photophobia  Respiratory: No cough, shortness of breath, wheezing  Cardio: No chest pain, leg swelling, palpitations  GI: No abdominal distension, abdominal pain, diarrhea, nausea, vomiting  : No difficulty urinating, dysuria, increased urinary frequency or urgency  MSK: Right leg pain, No myalgias, back pain  Skin: No rash, wound, pallor  Immuno: Not immunocompromised  Neuro: No dizziness, lightheadedness, headache, weakness  Heme/Lymph: Does not bruise or bleed easily  Psych: No confusion, anxiety, depression    Physical Exam:  Constitutional:       Appearance: Normal appearance. HENT:      Head: Normocephalic and atraumatic. Nose: Nose normal.   Cardiovascular:      Rate and Rhythm: Normal rate and regular rhythm. Pulses: Normal pulses. Pulmonary:      Effort: Pulmonary effort is normal.      Breath sounds: Normal breath sounds. Abdominal:      General: Abdomen is flat. Musculoskeletal:         General: Swelling (r knee) and tenderness (w varus maneuver ) present. Leg in wrap. Leg external rotated. Right LLE warm to touch, pulses intact 2+, sensation intact. Skin:     General: Skin is dry. Comminuted, displaced and angulated fracture involving the distal diametaphysis of right femur   2. Severe osteoarthritis      XR CHEST PORTABLE   Final Result      Chronic interstitial lung disease, acute on chronic process not excluded, but no dense focal consolidation. If desired CT of chest may be helpful for further characterization. XR KNEE RIGHT (3 VIEWS)   Final Result      No acute injury. RIGHT KNEE:      REASON FOR EXAM: Trauma, pain      FINDINGS:      Somewhat obliqued AP and crosstable lateral views of the right knee again demonstrate long stem intramedullary nail within the right femur with single distal interlocking screw. Comminuted fracture of the distal femoral diaphysis in the region of the    distal aspect of the intramedullary nail with varus angulation. No additional fractures. Knee joint maintained. IMPRESSION:      Comminuted angulated fracture of the distal femoral diaphysis. XR HIP 2-3 VW W PELVIS RIGHT   Final Result      No acute injury. RIGHT KNEE:      REASON FOR EXAM: Trauma, pain      FINDINGS:      Somewhat obliqued AP and crosstable lateral views of the right knee again demonstrate long stem intramedullary nail within the right femur with single distal interlocking screw. Comminuted fracture of the distal femoral diaphysis in the region of the    distal aspect of the intramedullary nail with varus angulation. No additional fractures. Knee joint maintained. IMPRESSION:      Comminuted angulated fracture of the distal femoral diaphysis. Assessment/Plan:   Talat Walker is an 79yo F w a PMH of dementia and CVA p/w right knee swelling. Pt does not recall how the event occurred and  noticed the swelling and brought her to the ED.     Right Femoral Fracture:  XR right knee showing comminuted angulated fracture of the distil femoral diaphysis. Ortho involved.   - CT right knee w/o contrast: Comminuted, displaced and angulated fracture involving the distal diametaphysis of right femur, Severe osteoarthritis   - Ortho surgery performed a right distal femur open reduction and internal fixation on 8/29/20  Pain Panel  - Roxicodone 5 mg Q4hr PRN  - acetaminophen q6h prn  - IVF NS @75  - PT/OT eval for needs upon discharge    Anemia:  HGB 10.3 on presentation. Continue to monitor  - follow H&H  - Transfuse Hb <7    Chronic Interstial lung disease  CXR with Chronic interstitial lung disease, acute on chronic process not excluded, but no dense focal consolidation. -asymptomatic, no rx complaints, no hypoxia  -COVID ruleout.  -consider CT of chest for further characterization.     Hypothyroidism  TSH=7.3, Free T4=1.0  - Synthroid 25 mcg daily    History of constipation  -home docusate 100 mg BID    Restless Leg syndrome  -home requip 1 mg TID    COVID-19 rule out  -covid pending  -droplet plus        Code Status: Full Code  FEN: Dietary Nutrition Supplements: Standard High Calorie Oral Supplement  DIET GENERAL;, NS @ 75ml/hr  PPX: SCDs, hold eliquis   DISPO: Lily Hooks MD, PGY-1  08/30/20  7:13 AM    This patient has been staffed and discussed with Ruben Gutierrez MD.

## 2020-08-31 LAB
ANION GAP SERPL CALCULATED.3IONS-SCNC: 8 MMOL/L (ref 3–16)
BASOPHILS ABSOLUTE: 0 K/UL (ref 0–0.2)
BASOPHILS RELATIVE PERCENT: 0.4 %
BLOOD BANK DISPENSE STATUS: NORMAL
BLOOD BANK PRODUCT CODE: NORMAL
BPU ID: NORMAL
BUN BLDV-MCNC: 16 MG/DL (ref 7–20)
CALCIUM SERPL-MCNC: 7.8 MG/DL (ref 8.3–10.6)
CHLORIDE BLD-SCNC: 111 MMOL/L (ref 99–110)
CO2: 23 MMOL/L (ref 21–32)
CREAT SERPL-MCNC: 0.6 MG/DL (ref 0.6–1.2)
DESCRIPTION BLOOD BANK: NORMAL
EOSINOPHILS ABSOLUTE: 0 K/UL (ref 0–0.6)
EOSINOPHILS RELATIVE PERCENT: 0.5 %
GFR AFRICAN AMERICAN: >60
GFR NON-AFRICAN AMERICAN: >60
GLUCOSE BLD-MCNC: 101 MG/DL (ref 70–99)
HCT VFR BLD CALC: 20.7 % (ref 36–48)
HCT VFR BLD CALC: 26 % (ref 36–48)
HEMOGLOBIN: 7 G/DL (ref 12–16)
HEMOGLOBIN: 8.8 G/DL (ref 12–16)
LYMPHOCYTES ABSOLUTE: 0.9 K/UL (ref 1–5.1)
LYMPHOCYTES RELATIVE PERCENT: 10.8 %
MCH RBC QN AUTO: 32.4 PG (ref 26–34)
MCHC RBC AUTO-ENTMCNC: 33.9 G/DL (ref 31–36)
MCV RBC AUTO: 95.6 FL (ref 80–100)
MONOCYTES ABSOLUTE: 1 K/UL (ref 0–1.3)
MONOCYTES RELATIVE PERCENT: 12.1 %
NEUTROPHILS ABSOLUTE: 6.2 K/UL (ref 1.7–7.7)
NEUTROPHILS RELATIVE PERCENT: 76.2 %
PDW BLD-RTO: 14.7 % (ref 12.4–15.4)
PLATELET # BLD: 196 K/UL (ref 135–450)
PMV BLD AUTO: 8.1 FL (ref 5–10.5)
POTASSIUM REFLEX MAGNESIUM: 3.9 MMOL/L (ref 3.5–5.1)
RBC # BLD: 2.17 M/UL (ref 4–5.2)
SODIUM BLD-SCNC: 142 MMOL/L (ref 136–145)
WBC # BLD: 8.2 K/UL (ref 4–11)

## 2020-08-31 PROCEDURE — 97162 PT EVAL MOD COMPLEX 30 MIN: CPT

## 2020-08-31 PROCEDURE — 80048 BASIC METABOLIC PNL TOTAL CA: CPT

## 2020-08-31 PROCEDURE — 6370000000 HC RX 637 (ALT 250 FOR IP): Performed by: ORTHOPAEDIC SURGERY

## 2020-08-31 PROCEDURE — 85014 HEMATOCRIT: CPT

## 2020-08-31 PROCEDURE — 1200000000 HC SEMI PRIVATE

## 2020-08-31 PROCEDURE — 2580000003 HC RX 258: Performed by: ORTHOPAEDIC SURGERY

## 2020-08-31 PROCEDURE — 97530 THERAPEUTIC ACTIVITIES: CPT

## 2020-08-31 PROCEDURE — 97166 OT EVAL MOD COMPLEX 45 MIN: CPT

## 2020-08-31 PROCEDURE — P9016 RBC LEUKOCYTES REDUCED: HCPCS

## 2020-08-31 PROCEDURE — 85025 COMPLETE CBC W/AUTO DIFF WBC: CPT

## 2020-08-31 PROCEDURE — 85018 HEMOGLOBIN: CPT

## 2020-08-31 PROCEDURE — 36430 TRANSFUSION BLD/BLD COMPNT: CPT

## 2020-08-31 RX ORDER — CEPHALEXIN 250 MG/1
250 CAPSULE ORAL 4 TIMES DAILY
Qty: 28 CAPSULE | Refills: 0 | Status: SHIPPED | OUTPATIENT
Start: 2020-08-31 | End: 2020-09-07

## 2020-08-31 RX ORDER — OXYCODONE HYDROCHLORIDE 5 MG/1
5 TABLET ORAL EVERY 6 HOURS PRN
Qty: 20 TABLET | Refills: 0 | Status: SHIPPED | OUTPATIENT
Start: 2020-08-31 | End: 2020-09-05

## 2020-08-31 RX ORDER — 0.9 % SODIUM CHLORIDE 0.9 %
20 INTRAVENOUS SOLUTION INTRAVENOUS ONCE
Status: DISCONTINUED | OUTPATIENT
Start: 2020-08-31 | End: 2020-09-01 | Stop reason: HOSPADM

## 2020-08-31 RX ADMIN — CEPHALEXIN 250 MG: 250 CAPSULE ORAL at 23:56

## 2020-08-31 RX ADMIN — CEPHALEXIN 250 MG: 250 CAPSULE ORAL at 12:58

## 2020-08-31 RX ADMIN — CEPHALEXIN 250 MG: 250 CAPSULE ORAL at 07:39

## 2020-08-31 RX ADMIN — CEPHALEXIN 250 MG: 250 CAPSULE ORAL at 17:49

## 2020-08-31 RX ADMIN — Medication 10 ML: at 22:44

## 2020-08-31 RX ADMIN — SODIUM CHLORIDE: 9 INJECTION, SOLUTION INTRAVENOUS at 14:43

## 2020-08-31 RX ADMIN — APIXABAN 2.5 MG: 2.5 TABLET, FILM COATED ORAL at 21:34

## 2020-08-31 RX ADMIN — DOCUSATE SODIUM 50MG AND SENNOSIDES 8.6MG 1 TABLET: 8.6; 5 TABLET, FILM COATED ORAL at 21:34

## 2020-08-31 RX ADMIN — DOCUSATE SODIUM 100 MG: 100 CAPSULE, LIQUID FILLED ORAL at 21:34

## 2020-08-31 RX ADMIN — DOCUSATE SODIUM 100 MG: 100 CAPSULE, LIQUID FILLED ORAL at 10:00

## 2020-08-31 RX ADMIN — ACETAMINOPHEN 650 MG: 325 TABLET ORAL at 21:34

## 2020-08-31 RX ADMIN — DOCUSATE SODIUM 50MG AND SENNOSIDES 8.6MG 1 TABLET: 8.6; 5 TABLET, FILM COATED ORAL at 10:00

## 2020-08-31 RX ADMIN — ROPINIROLE HYDROCHLORIDE 1 MG: 1 TABLET, FILM COATED ORAL at 15:23

## 2020-08-31 RX ADMIN — Medication 10 ML: at 10:01

## 2020-08-31 RX ADMIN — APIXABAN 2.5 MG: 2.5 TABLET, FILM COATED ORAL at 10:00

## 2020-08-31 RX ADMIN — LEVOTHYROXINE SODIUM 25 MCG: 0.03 TABLET ORAL at 07:39

## 2020-08-31 RX ADMIN — ROPINIROLE HYDROCHLORIDE 1 MG: 1 TABLET, FILM COATED ORAL at 21:34

## 2020-08-31 RX ADMIN — ROPINIROLE HYDROCHLORIDE 1 MG: 1 TABLET, FILM COATED ORAL at 10:00

## 2020-08-31 RX ADMIN — FLUVOXAMINE MALEATE 75 MG: 50 TABLET, COATED ORAL at 00:20

## 2020-08-31 RX ADMIN — FLUVOXAMINE MALEATE 75 MG: 50 TABLET, COATED ORAL at 21:35

## 2020-08-31 RX ADMIN — CEPHALEXIN 250 MG: 250 CAPSULE ORAL at 00:21

## 2020-08-31 ASSESSMENT — PAIN SCALES - PAIN ASSESSMENT IN ADVANCED DEMENTIA (PAINAD)
NEGVOCALIZATION: 0
TOTALSCORE: 0
NEGVOCALIZATION: 0
NEGVOCALIZATION: 0
TOTALSCORE: 0
CONSOLABILITY: 0
NEGVOCALIZATION: 0
BREATHING: 0
CONSOLABILITY: 0
CONSOLABILITY: 0
FACIALEXPRESSION: 0
BREATHING: 0
FACIALEXPRESSION: 0
TOTALSCORE: 0
BODYLANGUAGE: 0
NEGVOCALIZATION: 0
FACIALEXPRESSION: 0
TOTALSCORE: 0
BODYLANGUAGE: 0
BREATHING: 0
TOTALSCORE: 0
CONSOLABILITY: 0
TOTALSCORE: 0
BODYLANGUAGE: 0
BREATHING: 0
BODYLANGUAGE: 0
CONSOLABILITY: 0
BODYLANGUAGE: 0
TOTALSCORE: 0
BODYLANGUAGE: 0
NEGVOCALIZATION: 0
TOTALSCORE: 0
FACIALEXPRESSION: 0
BREATHING: 0
FACIALEXPRESSION: 0
BODYLANGUAGE: 0
TOTALSCORE: 0
FACIALEXPRESSION: 0
BODYLANGUAGE: 0
CONSOLABILITY: 0
BREATHING: 0
CONSOLABILITY: 0
FACIALEXPRESSION: 0
BODYLANGUAGE: 0
FACIALEXPRESSION: 0
BREATHING: 0
BREATHING: 0
BODYLANGUAGE: 0
FACIALEXPRESSION: 0
TOTALSCORE: 0
NEGVOCALIZATION: 0
FACIALEXPRESSION: 0
FACIALEXPRESSION: 0
NEGVOCALIZATION: 0
CONSOLABILITY: 0
TOTALSCORE: 0
CONSOLABILITY: 0
TOTALSCORE: 0
NEGVOCALIZATION: 0
NEGVOCALIZATION: 0
CONSOLABILITY: 0
TOTALSCORE: 0
FACIALEXPRESSION: 0
BODYLANGUAGE: 0
NEGVOCALIZATION: 0
CONSOLABILITY: 0
BREATHING: 0
BREATHING: 0
FACIALEXPRESSION: 0

## 2020-08-31 ASSESSMENT — PAIN SCALES - GENERAL
PAINLEVEL_OUTOF10: 3
PAINLEVEL_OUTOF10: 0

## 2020-08-31 NOTE — PROGRESS NOTES
Pt's  Daina Carias called and spoke to this nurse regarding update on pt's status. Update provided, all questions answered, pt's  verbalizes understanding and agrees to plan.

## 2020-08-31 NOTE — CARE COORDINATION
CM spoke with Mirtha at [facility] and they are able to take patient for skilled nursing, pending COVID results. CM spoke with patient's spouse about plan to get patient to SNF at [facility] once COVID results are back, likely tomorrow, also left a message for pt's son Nava Sayres.       Gregg Johnson, RN, BSN,   4th Floor Progressive Care Unit  [phone]

## 2020-08-31 NOTE — PROGRESS NOTES
Pt's AM Hgb 7.0. On-call resident notified. Order received for 1 unit PRBC transfusion. Will continue to monitor.  Electronically signed by Polina Ruelas RN on 8/31/2020 at 7:56 AM

## 2020-08-31 NOTE — PROGRESS NOTES
Pt's AM Hgb 7.0. On-call resident notified. Will continue to monitor.  Electronically signed by Jared Roman RN on 8/31/2020 at 4:30 AM

## 2020-08-31 NOTE — PROGRESS NOTES
rOPINIRole  1 mg Oral TID    docusate sodium  100 mg Oral BID    apixaban  2.5 mg Oral BID    sodium chloride flush  10 mL Intravenous 2 times per day    sennosides-docusate sodium  1 tablet Oral BID    sodium chloride flush  10 mL Intravenous 2 times per day    levothyroxine  25 mcg Oral Daily    fluvoxaMINE  75 mg Oral Nightly     Continuous Infusions:   sodium chloride 75 mL/hr at 08/30/20 1048     PRN Meds:sodium chloride flush, magnesium hydroxide, oxyCODONE, sodium chloride flush, acetaminophen **OR** acetaminophen, polyethylene glycol, promethazine **OR** ondansetron, melatonin    Objective:   Vitals:   T-max:  Patient Vitals for the past 8 hrs:   BP Temp Temp src Pulse Resp SpO2 Weight   08/31/20 0741 -- -- -- -- -- -- 149 lb 0.5 oz (67.6 kg)   08/31/20 0656 (!) 113/59 98.4 °F (36.9 °C) Axillary 82 20 98 % --   08/31/20 0647 115/60 98.5 °F (36.9 °C) Axillary 85 18 98 % --   08/31/20 0626 106/60 98.2 °F (36.8 °C) Axillary 80 20 96 % --   08/31/20 0313 104/63 99.5 °F (37.5 °C) Axillary 92 18 93 % --   08/31/20 0019 (!) 107/56 99.4 °F (37.4 °C) Axillary 94 20 92 % --       Intake/Output Summary (Last 24 hours) at 8/31/2020 1819  Last data filed at 8/31/2020 0636  Gross per 24 hour   Intake 2760 ml   Output 900 ml   Net 1860 ml       Review of Systems:  Constitutional: No fevers, chills  HENT: No congestion, dysphagia  Eyes: No visual changes, photophobia  Respiratory: No cough, shortness of breath, wheezing  Cardio: No chest pain, leg swelling, palpitations  GI: No abdominal distension, abdominal pain, diarrhea, nausea, vomiting  : No difficulty urinating, dysuria, increased urinary frequency or urgency  MSK: Right leg pain, No myalgias, back pain  Skin: No rash, wound, pallor  Immuno: Not immunocompromised  Neuro: No dizziness, lightheadedness, headache, weakness  Heme/Lymph: Does not bruise or bleed easily  Psych: No confusion, anxiety, depression    Physical Exam:  Constitutional:       Appearance: Normal appearance. HENT:      Head: Normocephalic and atraumatic. Nose: Nose normal.   Cardiovascular:      Rate and Rhythm: Normal rate and regular rhythm. Pulses: Normal pulses. Pulmonary:      Effort: Pulmonary effort is normal.      Breath sounds: Normal breath sounds. Abdominal:      General: Abdomen is flat. Musculoskeletal:         General: Swelling (r knee) and tenderness (w varus maneuver ) present. Leg in wrap. Leg external rotated. Right LLE warm to touch, pulses intact 2+, sensation intact. Skin:     General: Skin is dry. Neurological:      Mental Status: Mental status is at baseline. Pt is alert to first name only, makes nonsensical statements. LABS:    CBC:   Recent Labs     08/29/20  0650 08/30/20  0422 08/30/20  1815 08/31/20  0330   WBC 9.1 9.1  --  8.2   HGB 10.0* 8.0* 7.6* 7.0*   HCT 29.4* 23.3* 22.1* 20.7*    190  --  196   MCV 94.1 92.9  --  95.6     Renal:    Recent Labs     08/29/20  0650 08/30/20  0422 08/31/20  0330    141 142   K 4.0 4.5 3.9    108 111*   CO2 25 24 23   BUN 21* 17 16   CREATININE 0.8 0.8 0.6   GLUCOSE 102* 138* 101*   CALCIUM 8.6 8.2* 7.8*   MG 2.10  --   --    PHOS 3.1  --   --    ANIONGAP 10 9 8     Hepatic: No results for input(s): AST, ALT, BILITOT, BILIDIR, PROT, LABALBU, ALKPHOS in the last 72 hours. Troponin: No results for input(s): TROPONINI in the last 72 hours. BNP: No results for input(s): BNP in the last 72 hours. Lipids: No results for input(s): CHOL, HDL in the last 72 hours. Invalid input(s): LDLCALCU, TRIGLYCERIDE  ABGs:  No results for input(s): PHART, HFV0OUP, PO2ART, CZN3FEY, BEART, THGBART, O2AENKCK, CNN7JVW in the last 72 hours. INR:   Recent Labs     08/28/20  1821   INR 1.09     Lactate: No results for input(s): LACTATE in the last 72 hours.   Cultures:  -----------------------------------------------------------------  RAD:   XR FEMUR RIGHT (MIN 2 VIEWS)   Final Result      Distal right femur fracture status post open reduction internal fixation and removal of distal locking screw      FLUORO FOR SURGICAL PROCEDURES   Final Result      Intraoperative fluoroscopy was performed. Correlate with procedural note for additional information. XR FEMUR RIGHT (MIN 2 VIEWS)   Final Result      Intraoperative fluoroscopy was performed. Correlate with procedural note for additional information. CT KNEE RIGHT WO CONTRAST   Final Result      1. Comminuted, displaced and angulated fracture involving the distal diametaphysis of right femur   2. Severe osteoarthritis      XR CHEST PORTABLE   Final Result      Chronic interstitial lung disease, acute on chronic process not excluded, but no dense focal consolidation. If desired CT of chest may be helpful for further characterization. XR KNEE RIGHT (3 VIEWS)   Final Result      No acute injury. RIGHT KNEE:      REASON FOR EXAM: Trauma, pain      FINDINGS:      Somewhat obliqued AP and crosstable lateral views of the right knee again demonstrate long stem intramedullary nail within the right femur with single distal interlocking screw. Comminuted fracture of the distal femoral diaphysis in the region of the    distal aspect of the intramedullary nail with varus angulation. No additional fractures. Knee joint maintained. IMPRESSION:      Comminuted angulated fracture of the distal femoral diaphysis. XR HIP 2-3 VW W PELVIS RIGHT   Final Result      No acute injury. RIGHT KNEE:      REASON FOR EXAM: Trauma, pain      FINDINGS:      Somewhat obliqued AP and crosstable lateral views of the right knee again demonstrate long stem intramedullary nail within the right femur with single distal interlocking screw. Comminuted fracture of the distal femoral diaphysis in the region of the    distal aspect of the intramedullary nail with varus angulation. No additional fractures. Knee joint maintained.       IMPRESSION:      Comminuted angulated fracture of the distal femoral diaphysis. Assessment/Plan:   Quincy Graham is an 79yo F w a PMH of dementia and CVA p/w right knee swelling. Pt does not recall how the event occurred and  noticed the swelling and brought her to the ED.     Right Femoral Fracture, POD#2 s/p ORIF of right distal femur  XR right knee showing comminuted angulated fracture of the distil femoral diaphysis. Ortho involved. - CT right knee w/o contrast: Comminuted, displaced and angulated fracture involving the distal diametaphysis of right femur, Severe osteoarthritis   - Ortho surgery performed a right distal femur open reduction and internal fixation on 8/29/20  -f/u PT/OT recs  - eliquis 2.5mg BID  -Keflex 250 mg q6hr  Pain Panel  - Roxicodone 5 mg Q4hr PRN  - acetaminophen q6h prn  - IVF NS @75  - PT/OT eval for needs upon discharge    Post op blood loss Anemia:  HGB 10.3 on presentation. Continue to monitor  - follow H&H  - transfused 1u today, downtrending Hgb 7.0 from 7.6. F/u H&H this AM.  - Transfuse Hb <7    Chronic Interstial lung disease  CXR with Chronic interstitial lung disease, acute on chronic process not excluded, but no dense focal consolidation. -asymptomatic, no rx complaints, no hypoxia  -COVID ruleout.     Hypothyroidism  TSH=7.3, Free T4=1.0  - Synthroid 25 mcg daily    History of constipation  -home docusate 100 mg BID  -post-op senokot-s 8.6-50 mg BID    Restless Leg syndrome  -home requip 1 mg TID    Obsessive Compulsive Disorder  -home luvox 75 mg nightly    COVID-19 rule out  -covid pending  -droplet plus        Code Status: Full Code  FEN: Dietary Nutrition Supplements: Standard High Calorie Oral Supplement  DIET GENERAL; NS @ 75ml/hr  PPX: SCDs, eliquis   DISPO: Francois Ram MD, PGY-1  08/31/20  8:06 AM    This patient has been staffed and discussed with Nicolasa Sawyer MD.

## 2020-08-31 NOTE — PROGRESS NOTES
Occupational Therapy    Initial Assessment and Treatment  Date: 2020   Patient Name: Bailey Culver  MRN: [id number]     : 1935    Date of Service: 2020    Discharge Recommendations: Bailey Culver scored a 14/24 on the AM-PAC ADL Inpatient form. Current research shows that an AM-PAC score of 17 or less is typically not associated with a discharge to the patient's home setting. Based on the patient's AM-PAC score and their current ADL deficits, it is recommended that the patient have 3-5 sessions per week of Occupational Therapy at d/c to increase the patient's independence. Please see assessment section for further patient specific details. OT Equipment Recommendations  Equipment Needed: No(Defer to d/c facility)    Assessment   Performance deficits / Impairments: Decreased endurance;Decreased functional mobility ; Decreased ADL status; Decreased balance;Decreased strength    Assessment: Pt's functional independence is now limited due to non-weight bearing RLE restrictions, s/p R knee ORIF. Unclear of pt's true functional baseline as she is poor historian secondary to dementia. However, due to her new weight-bearing restrictions, assuming pt is typically more active in her care. Per chart, pt's family is planning for SNF at d/c. Feel this is reasonable plan. Will follow in acute care to maximize functional status. Treatment Diagnosis: Impaired ADLs, standing balance, transfers    Patient Education: Role of OT, VERONICA RLE - pt will need reinforcement    REQUIRES OT FOLLOW UP: Yes    Activity Tolerance: Patient Tolerated treatment well    Safety Devices in place: Yes  Type of devices: Left in chair;Nurse notified;Call light within reach; Chair alarm in place             Treatment Diagnosis: Impaired ADLs, standing balance, transfers      Restrictions  Position Activity Restriction  Other position/activity restrictions: NWB RLE, knee immobilizer    Subjective   General  Chart Reviewed: Yes    Additional Pertinent Hx: 80 y.o. F adm 8/28 with right knee/leg swelling. R knee XR: comminuted angulated fracture of the distal femoral diaphysis. Ortho surgery consult. To OR on 8/29 for right femur ORIF, removal of hardware. PMHx: R SHUBHAM, Alzheimers, stroke     Family / Caregiver Present: No    Diagnosis: Pain and swelling of R knee    Subjective  Subjective: Pt found supine, agreeable to OT. Pleasantly confused. Unaware of why she is in the hospital.    Patient Currently in Pain: Denies        Social/Functional History  Social/Functional History  Type of Home: Facility([facility])  Additional Comments: Pt unable to report any PLOF info. No info in chart regarding pt's prior level either. Objective    Treatment included functional transfer training, ADLs, and patient education. Vision: Within Functional Limits  Hearing: Exceptions to [facility]  Hearing Exceptions: Hard of hearing/hearing concerns      Orientation  Overall Orientation Status: Impaired  Orientation Level: Oriented to person(only)    Balance  Sitting Balance: Stand by assistance(sitting unsupported at EOB)    Standing Balance  Comment: Mod A x2 for static stance at RW, therapist's foot placed underneath pt's to ensure NWB is maintained.  Pt unable to advance LLE with RW for transfer; therefore pt sat back down and completed pivot transfer bed to chair    Toilet Transfers  Toilet - Technique: Stand pivot  Equipment Used: Standard bedside commode(simulated with bed-chair transfer)  Toilet Transfer: 2 Person assistance(Mod A x2)    ADL  Feeding: Independent;Setup  Grooming: Setup(seated to wipe hands)  LE Dressing: Dependent/Total(to don socks and knee immobilizer)  Toileting: Dependent/Total(pure wick in place, likely to require assist for clean up as she is unable to stand without BUE assist)    Bed mobility  Supine to Sit: Moderate assistance(HOB raised, use of rail; assist to advance RLE, cues for sequencing)

## 2020-08-31 NOTE — DISCHARGE SUMMARY
INTERNAL MEDICINE    RESIDENT DISCHARGE SUMMARY   Discharge Summaries      Patient ID: Romeo Enamorado   Gender: female      :  1935  AGE: 80 y.o. MRN:  [id number]  Code Status: Full Code   PCP: Tejinder Motta MD    Admit date:  2020      Discharge date:  No discharge date for patient encounter. Admitting Physician:  Roberto Hobbs MD    Discharge Physician: Bard Wenceslao MD     Admission Condition:  stable  Discharged Condition:  stable Stable    Discharge Diagnoses:    1-Right distal femoral fracture, s/p ORIF  2-Post-operative blood loss anemia  3-Chronic Interstitial Lung Disease  4-Dementia  5-Hypothyroidism  6-Constipation  7-Obesessive Compulsive Disorder      The patient was seen and examined on day of discharge and this discharge summary is in conjunction with any daily progress note from day of discharge. In the event that the patient is not discharged on the daily of the discharge summary being filed, it is to serve as the progress note for that day. Hospital Course:     Moo Garcia is a 81yo female PMH dementia, CVA, intramedullary nail in right femur presenting with right knee swelling. She lives in a memory unit at a skilled nursing facility and hr  helped provide history. Neither party is sure exactly what occurred; pt's  went to see his wife for his daily visit when he noticed her knee swelling. CT Knee -  1. Comminuted, displaced and angulated fracture involving the distal diametaphysis of right femur   2. Severe osteoarthritis      CXR-  Chronic interstitial lung disease, acute on chronic process not excluded, but no dense focal consolidation. If desired CT of chest may be helpful for further characterization    Pt underwent ORIF of right distal femoral fracture on 3/72 with no complications besides acute blood loss anemia 7.0 from 7.6 from 8.0. Transfused 1 unit pRBCs on .  Pt given prophylactic keflex 250 mg q6hr, and restarted on 2 VIEWS)   Final Result      Intraoperative fluoroscopy was performed. Correlate with procedural note for additional information. CT KNEE RIGHT WO CONTRAST   Final Result      1. Comminuted, displaced and angulated fracture involving the distal diametaphysis of right femur   2. Severe osteoarthritis      XR CHEST PORTABLE   Final Result      Chronic interstitial lung disease, acute on chronic process not excluded, but no dense focal consolidation. If desired CT of chest may be helpful for further characterization. XR KNEE RIGHT (3 VIEWS)   Final Result      No acute injury. RIGHT KNEE:      REASON FOR EXAM: Trauma, pain      FINDINGS:      Somewhat obliqued AP and crosstable lateral views of the right knee again demonstrate long stem intramedullary nail within the right femur with single distal interlocking screw. Comminuted fracture of the distal femoral diaphysis in the region of the    distal aspect of the intramedullary nail with varus angulation. No additional fractures. Knee joint maintained. IMPRESSION:      Comminuted angulated fracture of the distal femoral diaphysis. XR HIP 2-3 VW W PELVIS RIGHT   Final Result      No acute injury. RIGHT KNEE:      REASON FOR EXAM: Trauma, pain      FINDINGS:      Somewhat obliqued AP and crosstable lateral views of the right knee again demonstrate long stem intramedullary nail within the right femur with single distal interlocking screw. Comminuted fracture of the distal femoral diaphysis in the region of the    distal aspect of the intramedullary nail with varus angulation. No additional fractures. Knee joint maintained. IMPRESSION:      Comminuted angulated fracture of the distal femoral diaphysis.              Consults:     IP CONSULT TO ORTHOPEDIC SURGERY  IP CONSULT TO HOSPITALIST  IP CONSULT TO PHARMACY      Discharge Instructions/Follow-up:  Primary care in 1 week, f/u with ortho in 10-14 days for new x rays and staple (TYLENOL) 500 MG tablet Take 1,000 mg by mouth every 6 hours as needed for Pain       dextromethorphan-guaiFENesin (MUCINEX DM)  MG per extended release tablet Take 1 tablet by mouth every 12 hours as needed      diclofenac sodium 1 % GEL Apply 2 g topically 4 times daily as needed       senna (SENOKOT) 8.6 MG tablet Take 1 tablet by mouth every 12 hours as needed for Constipation      metoprolol (TOPROL XL) 25 MG XL tablet Take 50 mg by mouth daily              Time Spent on discharge is more than 30 minutes in the examination, evaluation, counseling and review of medications and discharge plan. Signed:    Catherine Doyle MD   Internal Medicine Resident, PGY-1  8/31/2020      Thank you Rachel Craig MD for the opportunity to be involved in this patient's care. If you have any questions or concerns please feel free to contact me. Patient seen and evaluated with the medical resident. I was physically present during the critical portions of the service when performed by the resident including the assessment and management of the patient. I agree with the findings and plans as described.     Hb stable after transfusion  Time spent 40 minutes        Anibal Pina MD  Hospitalist

## 2020-08-31 NOTE — PROGRESS NOTES
Pt's spouse Gena Jefferson called for informed consent for administration of blood products. All aspects of administration reviewed including indications, risks and benefits. Gena Jefferson voiced understanding and provided consent for the administration of blood products. Chris updated on pt's current status and plan of care. All questions answered.  Electronically signed by Ervin Edouard RN on 8/31/2020 at 8:03 AM

## 2020-08-31 NOTE — PROGRESS NOTES
This nurse spoke to pt's  Néstor Paredes and provided updates regarding patient status, all questions answered to the best of this nurse's ability. Jose David verbalizes understanding and agrees to plan.

## 2020-08-31 NOTE — PROGRESS NOTES
surgical history that includes Rotator cuff repair (2004) and Wrist fracture surgery (05/2009). Restrictions  Position Activity Restriction  Other position/activity restrictions: NWB RLE, knee immobilizer  Vision/Hearing  Vision: Within Functional Limits  Hearing: Exceptions to [facility]  Hearing Exceptions: Hard of hearing/hearing concerns     Subjective  General  Chart Reviewed: Yes  Patient assessed for rehabilitation services?: Yes  Additional Pertinent Hx: PMH: CVA 2011. Alzhiemers Pt admitted from memory care unit at [facility] with right knee swelling. No fall witnessed. Pt found to have distal femur fx. s/p ORIF 8/29. Family / Caregiver Present: No  Referring Practitioner: Loretta Dumont  Referral Date : 08/29/20  Diagnosis: right distal femur fx. Follows Commands: Within Functional Limits(slow processing)  General Comment  Comments: Pt confused. Subjective  Subjective: Pt supine in bed & willing to participate. Pain Screening  Patient Currently in Pain: Denies  Vital Signs  Patient Currently in Pain: Denies       Orientation  Orientation  Overall Orientation Status: Impaired  Orientation Level: Oriented to person;Disoriented to place; Disoriented to situation;Disoriented to time  Social/Functional History  Social/Functional History  Type of Home: Facility([facility])  Additional Comments: Pt unable to report any PLOF info. No info in chart regarding pt's prior level either. Objective          AROM RLE (degrees)  RLE General AROM: ankle WFL. hip & knee NT  AROM LLE (degrees)  LLE AROM : WNL  Strength RLE  Comment: NT  Strength LLE  Strength LLE: WFL  Comment: appears WFL. no MMT d/t dementia        Bed mobility  Supine to Sit: Moderate assistance(HOB elevated, pt using rail.  verbal cues required.)  Scooting: Maximal assistance(seated at EOB or in chair)  Transfers  Sit to Stand: 2 Person Assistance(mod A x 2 from bed. cues for NWB. pt unable to maintain.)  Stand to sit: 2 Person Assistance(mod A x 2)  Bed to Chair: 2 Person Assistance(mod A x 2 partial stand pivot. pt's right foot resting on PT's foot to assist with NWB. pt PWB despite cues.)  Ambulation  Ambulation?: No(pt unable)     Balance  Sitting - Static: Good  Sitting - Dynamic: Good;-  Standing - Static: Poor(mod A x 2 with RW, pt unable to maintain NWB despite max cues.  limited by dementia.)  Standing - Dynamic: Poor  Exercises  Ankle Pumps: x 20 bilat     Plan   Plan  Times per week: 5-7  Current Treatment Recommendations: ROM, Functional Mobility Training, Transfer Training, Safety Education & Training  Safety Devices  Type of devices: Call light within reach, Chair alarm in place, Left in chair, Nurse notified                                                       AM-PAC Score  AM-PAC Inpatient Mobility Raw Score : 8 (08/31/20 1026)  AM-PAC Inpatient T-Scale Score : 28.52 (08/31/20 1026)  Mobility Inpatient CMS 0-100% Score: 86.62 (08/31/20 1026)  Mobility Inpatient CMS G-Code Modifier : CM (08/31/20 1026)          Goals  Short term goals  Time Frame for Short term goals: D/C  Short term goal 1: supine<->sit min A  Short term goal 2: sit<->stand min A x 1-2, NWB right LE  Short term goal 3: bed<->chair min A x 1-2, NWB right LE  Patient Goals   Patient goals : not stated       Therapy Time   Individual Concurrent Group Co-treatment   Time In 0909         Time Out 0950         Minutes 111 Driving Monika De, PT

## 2020-08-31 NOTE — PLAN OF CARE
Problem: Pain:  Goal: Pain level will decrease  Description: Pain level will decrease  Note: Thus far this shift pt has not demonstrated any signs of pain using the advanced dementia pain scale. Will continue to monitor.

## 2020-09-01 VITALS
BODY MASS INDEX: 22.42 KG/M2 | OXYGEN SATURATION: 94 % | HEIGHT: 68 IN | TEMPERATURE: 99 F | SYSTOLIC BLOOD PRESSURE: 111 MMHG | RESPIRATION RATE: 18 BRPM | WEIGHT: 147.93 LBS | DIASTOLIC BLOOD PRESSURE: 67 MMHG | HEART RATE: 96 BPM

## 2020-09-01 LAB
ANION GAP SERPL CALCULATED.3IONS-SCNC: 4 MMOL/L (ref 3–16)
BASOPHILS ABSOLUTE: 0.1 K/UL (ref 0–0.2)
BASOPHILS RELATIVE PERCENT: 0.8 %
BUN BLDV-MCNC: 10 MG/DL (ref 7–20)
CALCIUM SERPL-MCNC: 7.9 MG/DL (ref 8.3–10.6)
CHLORIDE BLD-SCNC: 109 MMOL/L (ref 99–110)
CO2: 27 MMOL/L (ref 21–32)
CREAT SERPL-MCNC: 0.5 MG/DL (ref 0.6–1.2)
EOSINOPHILS ABSOLUTE: 0.3 K/UL (ref 0–0.6)
EOSINOPHILS RELATIVE PERCENT: 3.6 %
GFR AFRICAN AMERICAN: >60
GFR NON-AFRICAN AMERICAN: >60
GLUCOSE BLD-MCNC: 97 MG/DL (ref 70–99)
HCT VFR BLD CALC: 25.3 % (ref 36–48)
HEMOGLOBIN: 8.6 G/DL (ref 12–16)
LYMPHOCYTES ABSOLUTE: 1 K/UL (ref 1–5.1)
LYMPHOCYTES RELATIVE PERCENT: 13.5 %
MCH RBC QN AUTO: 31.8 PG (ref 26–34)
MCHC RBC AUTO-ENTMCNC: 34 G/DL (ref 31–36)
MCV RBC AUTO: 93.5 FL (ref 80–100)
MONOCYTES ABSOLUTE: 0.9 K/UL (ref 0–1.3)
MONOCYTES RELATIVE PERCENT: 11.2 %
NEUTROPHILS ABSOLUTE: 5.5 K/UL (ref 1.7–7.7)
NEUTROPHILS RELATIVE PERCENT: 70.9 %
PDW BLD-RTO: 14.8 % (ref 12.4–15.4)
PLATELET # BLD: 231 K/UL (ref 135–450)
PMV BLD AUTO: 7.9 FL (ref 5–10.5)
POTASSIUM REFLEX MAGNESIUM: 4.1 MMOL/L (ref 3.5–5.1)
RBC # BLD: 2.71 M/UL (ref 4–5.2)
REPORT: NORMAL
SARS-COV-2: NOT DETECTED
SODIUM BLD-SCNC: 140 MMOL/L (ref 136–145)
THIS TEST SENT TO: NORMAL
WBC # BLD: 7.7 K/UL (ref 4–11)

## 2020-09-01 PROCEDURE — 2580000003 HC RX 258: Performed by: ORTHOPAEDIC SURGERY

## 2020-09-01 PROCEDURE — 85025 COMPLETE CBC W/AUTO DIFF WBC: CPT

## 2020-09-01 PROCEDURE — 6370000000 HC RX 637 (ALT 250 FOR IP): Performed by: ORTHOPAEDIC SURGERY

## 2020-09-01 PROCEDURE — 80048 BASIC METABOLIC PNL TOTAL CA: CPT

## 2020-09-01 RX ADMIN — OXYCODONE 5 MG: 5 TABLET ORAL at 03:40

## 2020-09-01 RX ADMIN — CEPHALEXIN 250 MG: 250 CAPSULE ORAL at 12:38

## 2020-09-01 RX ADMIN — ACETAMINOPHEN 650 MG: 325 TABLET ORAL at 07:02

## 2020-09-01 RX ADMIN — ROPINIROLE HYDROCHLORIDE 1 MG: 1 TABLET, FILM COATED ORAL at 14:01

## 2020-09-01 RX ADMIN — OXYCODONE 5 MG: 5 TABLET ORAL at 16:54

## 2020-09-01 RX ADMIN — APIXABAN 2.5 MG: 2.5 TABLET, FILM COATED ORAL at 09:38

## 2020-09-01 RX ADMIN — DOCUSATE SODIUM 100 MG: 100 CAPSULE, LIQUID FILLED ORAL at 09:38

## 2020-09-01 RX ADMIN — SODIUM CHLORIDE: 9 INJECTION, SOLUTION INTRAVENOUS at 03:49

## 2020-09-01 RX ADMIN — ROPINIROLE HYDROCHLORIDE 1 MG: 1 TABLET, FILM COATED ORAL at 09:38

## 2020-09-01 RX ADMIN — LEVOTHYROXINE SODIUM 25 MCG: 0.03 TABLET ORAL at 07:02

## 2020-09-01 RX ADMIN — DOCUSATE SODIUM 50MG AND SENNOSIDES 8.6MG 1 TABLET: 8.6; 5 TABLET, FILM COATED ORAL at 09:38

## 2020-09-01 RX ADMIN — CEPHALEXIN 250 MG: 250 CAPSULE ORAL at 07:01

## 2020-09-01 ASSESSMENT — PAIN SCALES - PAIN ASSESSMENT IN ADVANCED DEMENTIA (PAINAD)
FACIALEXPRESSION: 0
NEGVOCALIZATION: 0
BODYLANGUAGE: 1
BREATHING: 0
CONSOLABILITY: 1
FACIALEXPRESSION: 0
BODYLANGUAGE: 0
TOTALSCORE: 7
BREATHING: 0
NEGVOCALIZATION: 0
NEGVOCALIZATION: 0
BREATHING: 0
NEGVOCALIZATION: 1
NEGVOCALIZATION: 0
TOTALSCORE: 0
BODYLANGUAGE: 0
NEGVOCALIZATION: 0
FACIALEXPRESSION: 0
TOTALSCORE: 0
BODYLANGUAGE: 0
NEGVOCALIZATION: 0
CONSOLABILITY: 0
BODYLANGUAGE: 0
NEGVOCALIZATION: 0
CONSOLABILITY: 0
FACIALEXPRESSION: 1
FACIALEXPRESSION: 0
BREATHING: 1
FACIALEXPRESSION: 0
TOTALSCORE: 4
CONSOLABILITY: 0
NEGVOCALIZATION: 0
TOTALSCORE: 0
CONSOLABILITY: 1
FACIALEXPRESSION: 1
BODYLANGUAGE: 0
BREATHING: 0
BREATHING: 0
TOTALSCORE: 0
BODYLANGUAGE: 0
CONSOLABILITY: 0
FACIALEXPRESSION: 0
CONSOLABILITY: 0
BODYLANGUAGE: 0
TOTALSCORE: 0
BREATHING: 0
TOTALSCORE: 0
BODYLANGUAGE: 0
TOTALSCORE: 0
BREATHING: 0
CONSOLABILITY: 0
NEGVOCALIZATION: 2
FACIALEXPRESSION: 0
FACIALEXPRESSION: 0
CONSOLABILITY: 0
BREATHING: 0
BREATHING: 0
BODYLANGUAGE: 2
TOTALSCORE: 0
CONSOLABILITY: 0

## 2020-09-01 ASSESSMENT — PAIN SCALES - GENERAL
PAINLEVEL_OUTOF10: 7
PAINLEVEL_OUTOF10: 4
PAINLEVEL_OUTOF10: 7
PAINLEVEL_OUTOF10: 0
PAINLEVEL_OUTOF10: 3

## 2020-09-01 NOTE — DISCHARGE INSTR - COC
(Oral)   Resp 18   Ht 5' 8\" (1.727 m)   Wt 147 lb 14.9 oz (67.1 kg)   SpO2 93%   BMI 22.49 kg/m²     Last documented pain score (0-10 scale): Pain Level: 0  Last Weight:   Wt Readings from Last 1 Encounters:   20 147 lb 14.9 oz (67.1 kg)     Mental Status:  {IP PT MENTAL STATUS:}    IV Access:  { MK IV ACCESS:686024521}    Nursing Mobility/ADLs:  Walking   {CHP DME ZZNJ:808443293}  Transfer  {CHP DME QGBF:896891320}  Bathing  {CHP DME TRCH:972861503}  Dressing  {CHP DME GXJI:549269147}  Toileting  {CHP DME RPLN:152647822}  Feeding  {CHP DME NQYJ:884290301}  Med Admin  {CHP DME PSQ}  Med Delivery   { MK MED Delivery:866623949}    Wound Care Documentation and Therapy:        Elimination:  Continence:   · Bowel: {YES / HF:68578}  · Bladder: {YES / WL:14850}  Urinary Catheter: {Urinary Catheter:523841627}   Colostomy/Ileostomy/Ileal Conduit: {YES / TZ:45287}       Date of Last BM: ***    Intake/Output Summary (Last 24 hours) at 2020 1053  Last data filed at 2020 0538  Gross per 24 hour   Intake 3900 ml   Output 1950 ml   Net 1950 ml     I/O last 3 completed shifts: In: 4200 [P.O.:1200;  I.V.:2700; Blood:300]  Out: 1950 [Urine:1950]    Safety Concerns:     [employer] Safety Concerns:149427298}    Impairments/Disabilities:      [employer] Impairments/Disabilities:179742521}    Nutrition Therapy:  Current Nutrition Therapy:   [employer] Diet List:979551840}    Routes of Feeding: {CHP DME Other Feedings:750734099}  Liquids: {Slp liquid thickness:47331}  Daily Fluid Restriction: {CHP DME Yes amt example:920112237}  Last Modified Barium Swallow with Video (Video Swallowing Test): {Done Not Done COGQ:039229632}    Treatments at the Time of Hospital Discharge:   Respiratory Treatments: ***  Oxygen Therapy:  {Therapy; copd oxygen:75761}  Ventilator:    {YAMILETH CARMEN Vent [facility]:196715946}    Rehab Therapies: {THERAPEUTIC INTERVENTION:8643456687}  Weight Bearing Status/Restrictions: {YAMILETH CARMEN Weight Bearin}  Other Medical Equipment (for information only, NOT a DME order):  {EQUIPMENT:062499543}  Other Treatments: ***    Patient's personal belongings (please select all that are sent with patient):  {CHP DME Belongings:978992737}    RN SIGNATURE:  {Esignature:391254391}    CASE MANAGEMENT/SOCIAL WORK SECTION    Inpatient Status Date: ***    Readmission Risk Assessment Score:  Readmission Risk              Risk of Unplanned Readmission:        15           Discharging to Facility/ Agency   Name:   [facility] [id number]       Phone: [phone]       Fax: [phone]        ·   ·     / signature: Electronically signed by Marielos Pineda RN on 20 at 10:53 AM EDT    PHYSICIAN SECTION    Prognosis: Fair    Condition at Discharge: Stable    Rehab Potential (if transferring to Rehab): Fair    Recommended Labs or Other Treatments After Discharge: Physical therapy. Routine blood work    Physician Certification: I certify the above information and transfer of Horton Paget  is necessary for the continuing treatment of the diagnosis listed and that she requires Kirby Rickuel for greater 30 days.      Update Admission H&P: No change in H&P    PHYSICIAN SIGNATURE:  Electronically signed by Brit Yen MD / Dr. Franchesca Schaefer MD on 20 at 11:58 AM EDT,

## 2020-09-01 NOTE — PROGRESS NOTES
Progress Note    Admit Date: 8/28/2020  Day: 5  Diet: Dietary Nutrition Supplements: Standard High Calorie Oral Supplement  DIET GENERAL;    CC: right knee pain     Interval history:     Ms. Stephani Lock had a right distal femur open reduction and internal fixation on 8/29/20. She is now POD#3. She has had acute post op blood loss anemia. Hgb stable after transfusion of 1u yesterday, now 8.6 from 8.8 from 7.0. No acute complaints at this time. Remains demented at baseline, alert to first name only. Medically stable for discharge. Tylor Oropeza to take pt pending COVID results, likely d/c today. 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 She lives in a memory unit at a skilled nursing facility and hr  helped provide history. Neither party is sure exactly what occurred; pt's  went to see his wife for his daily visit when he noticed her knee swelling. He asked what happened and the nurse said they were getting an XR to evaluate. He saw her yesterday but did not appreciate the swelling then. He decided to then take his wife to the ED to get it looked at. Pt was unable to answer many questions but was not in pain. Attempted to call Pt's residence for further information but was unable to get ahold of anyone.      In the ED VSS, xray of the right knee revealed a comminuted angulated fracture of the distal femoral diaphysis. CT ordered to assess for joint involvement. Ortho consulted. CT Knee -  1. Comminuted, displaced and angulated fracture involving the distal diametaphysis of right femur   2. Severe osteoarthritis     CXR-  Chronic interstitial lung disease, acute on chronic process not excluded, but no dense focal consolidation. If desired CT of chest may be helpful for further characterization.       Medications:     Scheduled Meds:   sodium chloride  20 mL Intravenous Once    cephALEXin  250 mg Oral 4 times per day    rOPINIRole  1 mg Oral TID    docusate sodium  100 mg Oral BID    apixaban  2.5 mg Oral BID    sodium chloride flush  10 mL Intravenous 2 times per day    sennosides-docusate sodium  1 tablet Oral BID    sodium chloride flush  10 mL Intravenous 2 times per day    levothyroxine  25 mcg Oral Daily    fluvoxaMINE  75 mg Oral Nightly     Continuous Infusions:   sodium chloride 75 mL/hr at 09/01/20 0349     PRN Meds:sodium chloride flush, magnesium hydroxide, oxyCODONE, sodium chloride flush, acetaminophen **OR** acetaminophen, polyethylene glycol, promethazine **OR** ondansetron, melatonin    Objective:   Vitals:   T-max:  Patient Vitals for the past 8 hrs:   BP Temp Temp src Pulse Resp SpO2 Weight   09/01/20 0342 134/71 98 °F (36.7 °C) Oral 76 20 94 % 147 lb 14.9 oz (67.1 kg)       Intake/Output Summary (Last 24 hours) at 9/1/2020 0831  Last data filed at 9/1/2020 0538  Gross per 24 hour   Intake 4200 ml   Output 1950 ml   Net 2250 ml       Review of Systems:  Constitutional: No fevers, chills  HENT: No congestion, dysphagia  Eyes: No visual changes, photophobia  Respiratory: No cough, shortness of breath, wheezing  Cardio: No chest pain, leg swelling, palpitations  GI: No abdominal distension, abdominal pain, diarrhea, nausea, vomiting  : No difficulty urinating, dysuria, increased urinary frequency or urgency  MSK: Right leg pain, No myalgias, back pain  Skin: No rash, wound, pallor  Immuno: Not immunocompromised  Neuro: No dizziness, lightheadedness, headache, weakness  Heme/Lymph: Does not bruise or bleed easily  Psych: No confusion, anxiety, depression    Physical Exam:  Constitutional:       Appearance: Normal appearance. HENT:      Head: Normocephalic and atraumatic. Nose: Nose normal.   Cardiovascular:      Rate and Rhythm: Normal rate and regular rhythm. Pulses: Normal pulses. Pulmonary:      Effort: Pulmonary effort is normal.      Breath sounds: Normal breath sounds. Abdominal:      General: Abdomen is flat. Musculoskeletal:         General: Swelling (r knee) and tenderness (w varus maneuver ) present. Leg in wrap. Leg external rotated. Right LLE warm to touch, pulses intact 2+, sensation intact. Skin:     General: Skin is dry. Neurological:      Mental Status: Mental status is at baseline. Pt is alert to first name only, makes nonsensical statements. LABS:    CBC:   Recent Labs     08/30/20  0422  08/31/20  0330 08/31/20  1445 09/01/20  0335   WBC 9.1  --  8.2  --  7.7   HGB 8.0*   < > 7.0* 8.8* 8.6*   HCT 23.3*   < > 20.7* 26.0* 25.3*     --  196  --  231   MCV 92.9  --  95.6  --  93.5    < > = values in this interval not displayed. Renal:    Recent Labs     08/30/20  0422 08/31/20  0330 09/01/20  0335    142 140   K 4.5 3.9 4.1    111* 109   CO2 24 23 27   BUN 17 16 10   CREATININE 0.8 0.6 0.5*   GLUCOSE 138* 101* 97   CALCIUM 8.2* 7.8* 7.9*   ANIONGAP 9 8 4     Hepatic: No results for input(s): AST, ALT, BILITOT, BILIDIR, PROT, LABALBU, ALKPHOS in the last 72 hours. Troponin: No results for input(s): TROPONINI in the last 72 hours. BNP: No results for input(s): BNP in the last 72 hours. Lipids: No results for input(s): CHOL, HDL in the last 72 hours. Invalid input(s): LDLCALCU, TRIGLYCERIDE  ABGs:  No results for input(s): PHART, OKI7LTG, PO2ART, VYJ7RQQ, BEART, THGBART, R2APPIDT, VSC0FNV in the last 72 hours. INR:   No results for input(s): INR in the last 72 hours. Lactate: No results for input(s): LACTATE in the last 72 hours. Cultures:  -----------------------------------------------------------------  RAD:   XR FEMUR RIGHT (MIN 2 VIEWS)   Final Result      Distal right femur fracture status post open reduction internal fixation and removal of distal locking screw      FLUORO FOR SURGICAL PROCEDURES   Final Result      Intraoperative fluoroscopy was performed. Correlate with procedural note for additional information. XR FEMUR RIGHT (MIN 2 VIEWS)   Final Result      Intraoperative fluoroscopy was performed. Correlate with procedural note for additional information. CT KNEE RIGHT WO CONTRAST   Final Result      1. Comminuted, displaced and angulated fracture involving the distal diametaphysis of right femur   2. Severe osteoarthritis      XR CHEST PORTABLE   Final Result      Chronic interstitial lung disease, acute on chronic process not excluded, but no dense focal consolidation. If desired CT of chest may be helpful for further characterization. XR KNEE RIGHT (3 VIEWS)   Final Result      No acute injury. RIGHT KNEE:      REASON FOR EXAM: Trauma, pain      FINDINGS:      Somewhat obliqued AP and crosstable lateral views of the right knee again demonstrate long stem intramedullary nail within the right femur with single distal interlocking screw. Comminuted fracture of the distal femoral diaphysis in the region of the    distal aspect of the intramedullary nail with varus angulation. No additional fractures. Knee joint maintained. IMPRESSION:      Comminuted angulated fracture of the distal femoral diaphysis. XR HIP 2-3 VW W PELVIS RIGHT   Final Result      No acute injury. RIGHT KNEE:      REASON FOR EXAM: Trauma, pain      FINDINGS:      Somewhat obliqued AP and crosstable lateral views of the right knee again demonstrate long stem intramedullary nail within the right femur with single distal interlocking screw. Comminuted fracture of the distal femoral diaphysis in the region of the    distal aspect of the intramedullary nail with varus angulation. No additional fractures. Knee joint maintained. IMPRESSION:      Comminuted angulated fracture of the distal femoral diaphysis. Assessment/Plan:   Man Pagan is an 81yo F w a PMH of dementia and CVA p/w right knee swelling.  Pt does not recall how the event occurred and  noticed the swelling and brought her to the ED.     Right Femoral Fracture, POD#2 s/p ORIF of right distal femur  XR right knee showing comminuted angulated fracture of the distil femoral diaphysis. Ortho involved. - CT right knee w/o contrast: Comminuted, displaced and angulated fracture involving the distal diametaphysis of right femur, Severe osteoarthritis   - Ortho surgery performed a right distal femur open reduction and internal fixation on 8/29/20  -PT 8/24, OT 14/24, dispo to [facility] with PT/OT follow up  - eliquis 2.5mg BID  -Keflex 250 mg q6hr. Pt to complete 7 day course of prophylactic keflex per ortho recs. -Roxicodone 5mg 3 day supply on discharge  Pain Panel  - Roxicodone 5 mg Q4hr PRN  - acetaminophen q6h prn  - IVF NS @75    Post op blood loss Anemia:  HGB 10.3 on presentation. Continue to monitor  - follow H&H  - transfused 1u yesterday, stable Hgb 8.6 from 8.8 from 7.0.  - Transfuse Hb <8    Chronic Interstial lung disease  CXR with Chronic interstitial lung disease, acute on chronic process not excluded, but no dense focal consolidation. -asymptomatic, no rx complaints, no hypoxia  -COVID ruleout.     Hypothyroidism  TSH=7.3, Free T4=1.0  - Synthroid 25 mcg daily    History of constipation  -home docusate 100 mg BID  -post-op senokot-s 8.6-50 mg BID    Restless Leg syndrome  -home requip 1 mg TID    Obsessive Compulsive Disorder  -home luvox 75 mg nightly    COVID-19 rule out  -covid pending  -droplet plus        Code Status: Full Code  FEN: Dietary Nutrition Supplements: Standard High Calorie Oral Supplement  DIET GENERAL; NS @ 75ml/hr  PPX: SCDs, eliquis   DISPO: Ike Gomez MD, PGY-1  09/01/20  8:31 AM    This patient has been staffed and discussed with Joshua Ballesteros MD.

## 2020-09-01 NOTE — PROGRESS NOTES
Called and spoke with Jayne Oroscorow (pt's ), all updates given, all questions answered.  made aware of patient's discharge to [facility] this evening.

## 2020-09-01 NOTE — CARE COORDINATION
Case Management Assessment            Discharge Note                    Date / Time of Note: 9/1/2020 11:12 AM                  Discharge Note Completed by: Gary Mccoy    Patient Name: Christin Keith   YOB: 1935  Diagnosis: Pain and swelling of right knee [M25.561, M25.461]  Pain and swelling of right knee [M25.561, M25.461]   Date / Time: 8/28/2020  3:24 PM    Current PCP: Mahesh Paris MD  Clinic patient: No    Hospitalization in the last 30 days: No    Advance Directives:  Code Status: Full Code  [place] DNR form completed and on chart: Not Indicated    Financial:  Payor: MEDICARE / Plan: MEDICARE PART A AND B / Product Type: *No Product type* /      Pharmacy:    Jyotsna Pfeiffer. [address] [phone] - F [phone]  [place] [facility] [address]  [address] [place] [address]  Phone: [phone] Fax: [phone]    [facility] [address] [phone] - F [phone]  [address] [place] [id number]  Phone: [phone] Fax: [phone]      Assistance purchasing medications?:    Assistance provided by Case Management: None at this time    Does patient want to participate in local refill/ meds to beds program?:      Meds To Beds General Rules:  1. Can ONLY be done Monday- Friday between 8:30am-5pm  2. Prescription(s) must be in pharmacy by 3pm to be filled same day  3. Copy of patient's insurance/ prescription drug card and patient face sheet must be sent along with the prescription(s)  4. Cost of Rx cannot be added to hospital bill. If financial assistance is needed, please contact unit  or ;  or  CANNOT provide pharmacy voucher for patients co-pays  5.  Patients can then  the prescription on their way out of the hospital at discharge, or pharmacy can deliver to the bedside if staff is available. (payment due at time of pick-up or delivery - cash, check,

## 2020-09-01 NOTE — PROGRESS NOTES
Pt alert to self; pleasantly confused; follows commands. Neuro checks stable throughout shift. Pain rating with PAINADS assessment ranged from 0-7 throughout shift, worse after ambulating/repositioning/assessment of dressing on RLE. PRN tylenol administered x2 and PRN oxycodone 5mg administered x1. PT asleep with RR >10 on reassessment. Pt is non-tele; hemodynamically stable per vitals and assessments. AM hgb resulted 8.6 and hct 25.3. Scant drainage noted on distal portion of dressing; stable throughout shift.  Geoff Bowden called and updated on pt status and plan of care; including pending discharge tentatively scheduled for 1150. Requested that MD contact son Gallo Cosme at [phone]; Son wanted to know if report from nursing home regarding circumstances preceding admission to hospital was a plausible explanation for a fracture femur.

## 2020-09-01 NOTE — PROGRESS NOTES
Report called to [address]. Spoke with nurse Juan C Ahuja cleaned up and prepared for first care transport.

## 2022-07-14 NOTE — PLAN OF CARE
Occasional, somewhat relief with fiber supplement and probiotic.  Patient has UC  -discussed vegetables,fruits and ensure proper hydration  -miralax once daily as needed Ineffective  Goal: Ability to achieve and maintain a regular respiratory rate  8/30/2020 0852 by Lisa Sanford RN  Outcome: Met This Shift  Note: No fever resp easy, no cough      Problem: Breathing Pattern - Ineffective  Goal: Ability to achieve and maintain a regular respiratory rate  8/30/2020 0852 by Lisa Sanford RN  Outcome: Met This Shift  Note: No fever resp easy, no cough

## (undated) DEVICE — 3M™ COBAN™ NL STERILE NON-LATEX SELF-ADHERENT WRAP, 2086S, 6 IN X 5 YD (15 CM X 4,5 M), 12 ROLLS/CASE: Brand: 3M™ COBAN™

## (undated) DEVICE — TRAY CATHETER 16FR F INCLUDE BARDX IC COMPLT CARE DRNGE BG

## (undated) DEVICE — SURE SET-DOUBLE BASIN-LF: Brand: MEDLINE INDUSTRIES, INC.

## (undated) DEVICE — DRAPE 70X60IN SPLIT IMPERV ADHES STRIP

## (undated) DEVICE — Z DISCONTINUED NO SUB IDED GLOVE SURG BEAD CUF 8 STD PF WHT STRL TRIUMPH LT LTX

## (undated) DEVICE — JEWISH HOSPITAL TURNOVER KIT: Brand: MEDLINE INDUSTRIES, INC.

## (undated) DEVICE — DRESSING FOAM W4XL12IN SIL RECT ADH WTRPRF FLM BK W/ BORD

## (undated) DEVICE — SOLUTION IV 1000ML 0.9% SOD CHL

## (undated) DEVICE — SUTURE VCRL SZ 0 L18IN ABSRB UD L36MM CT-1 1/2 CIR J840D

## (undated) DEVICE — STAPLER SKIN H3.9MM WIRE DIA0.58MM CRWN 6.9MM 35 STPL ROT

## (undated) DEVICE — UNDERGLOVE SURG SZ 8 BLU LTX FREE SYN POLYISOPRENE POLYMER

## (undated) DEVICE — SUTURE VCRL SZ 2-0 L18IN ABSRB UD CT-1 L36MM 1/2 CIR J839D

## (undated) DEVICE — Device: Brand: NCB®

## (undated) DEVICE — PLATE ES AD W 9FT CRD 2

## (undated) DEVICE — PACK PROCEDURE SURG TOT HIP

## (undated) DEVICE — 1010 S-DRAPE TOWEL DRAPE 10/BX: Brand: STERI-DRAPE™

## (undated) DEVICE — APPLICATOR PREP 26ML 0.7% IOD POVACRYLEX 74% ISO ALC ST

## (undated) DEVICE — 1000 S-DRAPE TOWEL DRAPE 10/BX: Brand: STERI-DRAPE™

## (undated) DEVICE — SUTURE STRATAFIX SYMMETRIC PDS + SZ 2-0 L18IN ABSRB VLT SXPP1A403

## (undated) DEVICE — SHEET, ORTHO, SPLIT, STERILE: Brand: MEDLINE

## (undated) DEVICE — GOWN,SIRUS,POLYRNF,BRTHSLV,LG,30/CS: Brand: MEDLINE

## (undated) DEVICE — APPLICATOR MEDICATED 26 CC SOLUTION HI LT ORNG CHLORAPREP

## (undated) DEVICE — C-ARMOR C-ARM EQUIPMENT COVERS CLEAR STERILE UNIVERSAL FIT 12 PER CASE: Brand: C-ARMOR

## (undated) DEVICE — COVER LT HNDL BLU PLAS

## (undated) DEVICE — SUTURE STRATAFIX SYMMETRIC PDS + SZ 1 L18IN ABSRB VLT L48MM SXPP1A400

## (undated) DEVICE — DRAPE C ARM UNIV W41XL74IN CLR PLAS XR VELC CLSR POLY STRP

## (undated) DEVICE — COVER,TABLE,HEAVY DUTY,77"X90",STRL: Brand: MEDLINE

## (undated) DEVICE — SPONGE,LAP,18"X18",DLX,XR,ST,5/PK,40/PK: Brand: MEDLINE

## (undated) DEVICE — SUTURE MCRYL SZ 4-0 L27IN ABSRB UD L19MM PS-2 1/2 CIR PRIM Y426H

## (undated) DEVICE — GOWN,SIRUS,POLYRNF,BRTHSLV,XL,30/CS: Brand: MEDLINE